# Patient Record
Sex: MALE | Race: WHITE | NOT HISPANIC OR LATINO | Employment: OTHER | URBAN - METROPOLITAN AREA
[De-identification: names, ages, dates, MRNs, and addresses within clinical notes are randomized per-mention and may not be internally consistent; named-entity substitution may affect disease eponyms.]

---

## 2018-05-22 ENCOUNTER — TELEPHONE (OUTPATIENT)
Dept: RHEUMATOLOGY | Facility: CLINIC | Age: 43
End: 2018-05-22

## 2018-05-22 NOTE — TELEPHONE ENCOUNTER
----- Message from Oseas Hart sent at 5/22/2018 10:27 AM CDT -----  Contact: self- [phone]  Patient called in regards to getting lab orders so that he can get his infusion on this Friday at 8am. Please contact patient at [phone].

## 2018-05-22 NOTE — TELEPHONE ENCOUNTER
Spoke with pt and he is seen over at the [facility]. Advised patient he will need to contact the [facility] clinic for lab orders. Pt verbalized understanding.

## 2019-02-11 ENCOUNTER — TELEPHONE (OUTPATIENT)
Dept: RHEUMATOLOGY | Facility: CLINIC | Age: 44
End: 2019-02-11

## 2019-02-11 NOTE — TELEPHONE ENCOUNTER
----- Message from Shana Luevano sent at 2/11/2019  3:18 PM CST -----  Contact: pt  Type:  RX Refill Request    Who Called: Rx  Refill or New Rx:Refill  RX Name and Strength: Steriod dofax for his goout flare-ups  How is the patient currently taking it? (ex. 1XDay): as needed  Is this a 30 day or 90 day RX:30  Preferred Pharmacy with phone number:[facility]  Local or Mail Order:local  Ordering Provider:Dr. Odom  Would the patient rather a call back or a response via [facility]? Call back  Best Call Back Number:[phone]  Additional Information: N/A

## 2019-02-11 NOTE — TELEPHONE ENCOUNTER
Called patient back he is calling for another medrol dose pack. Didn't see a order to do a refill.  Please Advise.

## 2019-02-12 NOTE — TELEPHONE ENCOUNTER
----- Message from Carlo Tunrer sent at 2/12/2019 12:35 PM CST -----  Contact: self [phone]  .Type:  Needs Medical Advice    Who Called: Jonh Najera  Symptoms (please be specific): n/a   How long has patient had these symptoms:  n/a  Pharmacy name and phone #:  n/a  Would the patient rather a call back or a response via [facility]? Call back  Best Call Back Number: [phone]  Additional Information: Would like to follow-up with nurse regarding status of refill request

## 2019-02-12 NOTE — TELEPHONE ENCOUNTER
Spoke with patient. He is in a gout flare and need a medrol dose pack called in. Patient advised that he will have to call the [facility]. Number given for [facility].

## 2019-03-22 ENCOUNTER — HOSPITAL ENCOUNTER (INPATIENT)
Facility: HOSPITAL | Age: 44
LOS: 5 days | Discharge: HOME | DRG: 478 | End: 2019-03-27
Attending: EMERGENCY MEDICINE | Admitting: INTERNAL MEDICINE
Payer: MEDICAID

## 2019-03-22 DIAGNOSIS — I33.0 INFECTIVE ENDOCARDITIS: ICD-10-CM

## 2019-03-22 DIAGNOSIS — M1A.00X0 CHRONIC GOUTY ARTHRITIS: ICD-10-CM

## 2019-03-22 DIAGNOSIS — L03.119 CELLULITIS AND ABSCESS OF FOOT: ICD-10-CM

## 2019-03-22 DIAGNOSIS — M86.9 OSTEOMYELITIS OF RIGHT FOOT, UNSPECIFIED TYPE: Primary | ICD-10-CM

## 2019-03-22 DIAGNOSIS — L02.619 CELLULITIS AND ABSCESS OF FOOT: ICD-10-CM

## 2019-03-22 PROBLEM — L03.031 CELLULITIS AND ABSCESS OF TOE, RIGHT: Status: ACTIVE | Noted: 2019-03-22

## 2019-03-22 PROBLEM — L02.611 CELLULITIS AND ABSCESS OF TOE, RIGHT: Status: ACTIVE | Noted: 2019-03-22

## 2019-03-22 LAB
ALBUMIN SERPL BCP-MCNC: 3.1 G/DL
ALP SERPL-CCNC: 110 U/L
ALT SERPL W/O P-5'-P-CCNC: 18 U/L
ANION GAP SERPL CALC-SCNC: 13 MMOL/L
AST SERPL-CCNC: 22 U/L
BASOPHILS # BLD AUTO: ABNORMAL K/UL
BASOPHILS NFR BLD: 0 %
BILIRUB SERPL-MCNC: 0.6 MG/DL
BUN SERPL-MCNC: 20 MG/DL
CALCIUM SERPL-MCNC: 10.1 MG/DL
CHLORIDE SERPL-SCNC: 97 MMOL/L
CO2 SERPL-SCNC: 25 MMOL/L
CREAT SERPL-MCNC: 1.1 MG/DL
CRP SERPL-MCNC: 154.8 MG/L
DIFFERENTIAL METHOD: ABNORMAL
EOSINOPHIL # BLD AUTO: ABNORMAL K/UL
EOSINOPHIL NFR BLD: 4 %
ERYTHROCYTE [DISTWIDTH] IN BLOOD BY AUTOMATED COUNT: 13.8 %
EST. GFR  (AFRICAN AMERICAN): >60 ML/MIN/1.73 M^2
EST. GFR  (NON AFRICAN AMERICAN): >60 ML/MIN/1.73 M^2
GLUCOSE SERPL-MCNC: 120 MG/DL
HCT VFR BLD AUTO: 44.6 %
HGB BLD-MCNC: 15.4 G/DL
LYMPHOCYTES # BLD AUTO: ABNORMAL K/UL
LYMPHOCYTES NFR BLD: 23 %
MCH RBC QN AUTO: 28.1 PG
MCHC RBC AUTO-ENTMCNC: 34.5 G/DL
MCV RBC AUTO: 81 FL
METAMYELOCYTES NFR BLD MANUAL: 2 %
MONOCYTES # BLD AUTO: ABNORMAL K/UL
MONOCYTES NFR BLD: 11 %
MYELOCYTES NFR BLD MANUAL: 3 %
NEUTROPHILS NFR BLD: 54 %
NEUTS BAND NFR BLD MANUAL: 3 %
PLATELET # BLD AUTO: 417 K/UL
PLATELET BLD QL SMEAR: ABNORMAL
PMV BLD AUTO: 9 FL
POTASSIUM SERPL-SCNC: 3.7 MMOL/L
PROT SERPL-MCNC: 8.5 G/DL
RBC # BLD AUTO: 5.48 M/UL
SODIUM SERPL-SCNC: 135 MMOL/L
WBC # BLD AUTO: 15.81 K/UL

## 2019-03-22 PROCEDURE — C1751 CATH, INF, PER/CENT/MIDLINE: HCPCS

## 2019-03-22 PROCEDURE — 86140 C-REACTIVE PROTEIN: CPT

## 2019-03-22 PROCEDURE — 87186 SC STD MICRODIL/AGAR DIL: CPT

## 2019-03-22 PROCEDURE — 96375 TX/PRO/DX INJ NEW DRUG ADDON: CPT

## 2019-03-22 PROCEDURE — 85007 BL SMEAR W/DIFF WBC COUNT: CPT

## 2019-03-22 PROCEDURE — 80053 COMPREHEN METABOLIC PANEL: CPT

## 2019-03-22 PROCEDURE — 99285 EMERGENCY DEPT VISIT HI MDM: CPT | Mod: 25

## 2019-03-22 PROCEDURE — 87077 CULTURE AEROBIC IDENTIFY: CPT

## 2019-03-22 PROCEDURE — 87040 BLOOD CULTURE FOR BACTERIA: CPT

## 2019-03-22 PROCEDURE — 86703 HIV-1/HIV-2 1 RESULT ANTBDY: CPT

## 2019-03-22 PROCEDURE — 85027 COMPLETE CBC AUTOMATED: CPT

## 2019-03-22 PROCEDURE — 36410 VNPNXR 3YR/> PHY/QHP DX/THER: CPT

## 2019-03-22 PROCEDURE — 96374 THER/PROPH/DIAG INJ IV PUSH: CPT

## 2019-03-22 PROCEDURE — 25000003 PHARM REV CODE 250: Performed by: EMERGENCY MEDICINE

## 2019-03-22 PROCEDURE — 63600175 PHARM REV CODE 636 W HCPCS: Performed by: EMERGENCY MEDICINE

## 2019-03-22 PROCEDURE — 11000001 HC ACUTE MED/SURG PRIVATE ROOM

## 2019-03-22 RX ORDER — MAG HYDROX/ALUMINUM HYD/SIMETH 200-200-20
30 SUSPENSION, ORAL (FINAL DOSE FORM) ORAL EVERY 6 HOURS PRN
Status: DISCONTINUED | OUTPATIENT
Start: 2019-03-23 | End: 2019-03-27 | Stop reason: HOSPADM

## 2019-03-22 RX ORDER — IPRATROPIUM BROMIDE AND ALBUTEROL SULFATE 2.5; .5 MG/3ML; MG/3ML
3 SOLUTION RESPIRATORY (INHALATION) EVERY 4 HOURS PRN
Status: DISCONTINUED | OUTPATIENT
Start: 2019-03-23 | End: 2019-03-27 | Stop reason: HOSPADM

## 2019-03-22 RX ORDER — SODIUM CHLORIDE 0.9 % (FLUSH) 0.9 %
5 SYRINGE (ML) INJECTION
Status: DISCONTINUED | OUTPATIENT
Start: 2019-03-23 | End: 2019-03-27 | Stop reason: HOSPADM

## 2019-03-22 RX ORDER — GUAIFENESIN 100 MG/5ML
200 SOLUTION ORAL EVERY 4 HOURS PRN
Status: DISCONTINUED | OUTPATIENT
Start: 2019-03-23 | End: 2019-03-27 | Stop reason: HOSPADM

## 2019-03-22 RX ORDER — OXYCODONE AND ACETAMINOPHEN 5; 325 MG/1; MG/1
1 TABLET ORAL EVERY 4 HOURS PRN
Status: DISCONTINUED | OUTPATIENT
Start: 2019-03-23 | End: 2019-03-27 | Stop reason: HOSPADM

## 2019-03-22 RX ORDER — LISINOPRIL 10 MG/1
10 TABLET ORAL DAILY
Status: DISCONTINUED | OUTPATIENT
Start: 2019-03-23 | End: 2019-03-27 | Stop reason: HOSPADM

## 2019-03-22 RX ORDER — VANCOMYCIN HCL IN 5 % DEXTROSE 1G/250ML
1000 PLASTIC BAG, INJECTION (ML) INTRAVENOUS
Status: DISCONTINUED | OUTPATIENT
Start: 2019-03-22 | End: 2019-03-22

## 2019-03-22 RX ORDER — METOPROLOL TARTRATE 50 MG/1
50 TABLET ORAL 2 TIMES DAILY
Status: DISCONTINUED | OUTPATIENT
Start: 2019-03-23 | End: 2019-03-27 | Stop reason: HOSPADM

## 2019-03-22 RX ORDER — ACETAMINOPHEN 325 MG/1
650 TABLET ORAL EVERY 8 HOURS PRN
Status: DISCONTINUED | OUTPATIENT
Start: 2019-03-23 | End: 2019-03-22

## 2019-03-22 RX ORDER — OXYCODONE AND ACETAMINOPHEN 10; 325 MG/1; MG/1
1 TABLET ORAL EVERY 4 HOURS PRN
Status: DISCONTINUED | OUTPATIENT
Start: 2019-03-23 | End: 2019-03-27 | Stop reason: HOSPADM

## 2019-03-22 RX ORDER — DIPHENHYDRAMINE HCL 25 MG
25 CAPSULE ORAL EVERY 6 HOURS PRN
Status: DISCONTINUED | OUTPATIENT
Start: 2019-03-23 | End: 2019-03-27 | Stop reason: HOSPADM

## 2019-03-22 RX ORDER — IBUPROFEN 200 MG
1 TABLET ORAL DAILY
Status: DISCONTINUED | OUTPATIENT
Start: 2019-03-23 | End: 2019-03-27 | Stop reason: HOSPADM

## 2019-03-22 RX ORDER — ACETAMINOPHEN 325 MG/1
650 TABLET ORAL EVERY 6 HOURS PRN
Status: DISCONTINUED | OUTPATIENT
Start: 2019-03-23 | End: 2019-03-27 | Stop reason: HOSPADM

## 2019-03-22 RX ORDER — AMLODIPINE BESYLATE 10 MG/1
10 TABLET ORAL DAILY
Status: DISCONTINUED | OUTPATIENT
Start: 2019-03-23 | End: 2019-03-27 | Stop reason: HOSPADM

## 2019-03-22 RX ORDER — ONDANSETRON 2 MG/ML
4 INJECTION INTRAMUSCULAR; INTRAVENOUS EVERY 6 HOURS PRN
Status: DISCONTINUED | OUTPATIENT
Start: 2019-03-23 | End: 2019-03-27 | Stop reason: HOSPADM

## 2019-03-22 RX ORDER — SODIUM CHLORIDE 9 MG/ML
INJECTION, SOLUTION INTRAVENOUS CONTINUOUS
Status: ACTIVE | OUTPATIENT
Start: 2019-03-23 | End: 2019-03-24

## 2019-03-22 RX ORDER — HYDRALAZINE HYDROCHLORIDE 20 MG/ML
10 INJECTION INTRAMUSCULAR; INTRAVENOUS EVERY 6 HOURS PRN
Status: DISCONTINUED | OUTPATIENT
Start: 2019-03-23 | End: 2019-03-27 | Stop reason: HOSPADM

## 2019-03-22 RX ORDER — ONDANSETRON 2 MG/ML
4 INJECTION INTRAMUSCULAR; INTRAVENOUS EVERY 12 HOURS PRN
Status: DISCONTINUED | OUTPATIENT
Start: 2019-03-23 | End: 2019-03-22

## 2019-03-22 RX ORDER — FAMOTIDINE 20 MG/1
20 TABLET, FILM COATED ORAL 2 TIMES DAILY
Status: DISCONTINUED | OUTPATIENT
Start: 2019-03-22 | End: 2019-03-27 | Stop reason: HOSPADM

## 2019-03-22 RX ORDER — VANCOMYCIN HCL IN 5 % DEXTROSE 1G/250ML
1000 PLASTIC BAG, INJECTION (ML) INTRAVENOUS
Status: COMPLETED | OUTPATIENT
Start: 2019-03-22 | End: 2019-03-22

## 2019-03-22 RX ADMIN — SODIUM CHLORIDE 500 ML: 0.9 INJECTION, SOLUTION INTRAVENOUS at 08:03

## 2019-03-22 RX ADMIN — PIPERACILLIN SODIUM AND TAZOBACTAM SODIUM 4.5 G: 4; .5 INJECTION, POWDER, LYOPHILIZED, FOR SOLUTION INTRAVENOUS at 08:03

## 2019-03-22 RX ADMIN — VANCOMYCIN HYDROCHLORIDE 1000 MG: 1 INJECTION, POWDER, FOR SOLUTION INTRAVENOUS at 08:03

## 2019-03-23 LAB
ALBUMIN SERPL BCP-MCNC: 2.6 G/DL
ALP SERPL-CCNC: 83 U/L
ALT SERPL W/O P-5'-P-CCNC: 14 U/L
ANION GAP SERPL CALC-SCNC: 10 MMOL/L
ANISOCYTOSIS BLD QL SMEAR: SLIGHT
AST SERPL-CCNC: 24 U/L
BASOPHILS NFR BLD: 0 %
BILIRUB SERPL-MCNC: 0.4 MG/DL
BUN SERPL-MCNC: 20 MG/DL
CALCIUM SERPL-MCNC: 9.2 MG/DL
CHLORIDE SERPL-SCNC: 98 MMOL/L
CO2 SERPL-SCNC: 28 MMOL/L
CREAT SERPL-MCNC: 1.3 MG/DL
DACRYOCYTES BLD QL SMEAR: ABNORMAL
DIFFERENTIAL METHOD: ABNORMAL
EOSINOPHIL NFR BLD: 8 %
ERYTHROCYTE [DISTWIDTH] IN BLOOD BY AUTOMATED COUNT: 13.9 %
EST. GFR  (AFRICAN AMERICAN): >60 ML/MIN/1.73 M^2
EST. GFR  (NON AFRICAN AMERICAN): >60 ML/MIN/1.73 M^2
GLUCOSE SERPL-MCNC: 105 MG/DL
HCT VFR BLD AUTO: 42.9 %
HGB BLD-MCNC: 14.5 G/DL
LACTATE SERPL-SCNC: 1.3 MMOL/L
LACTATE SERPL-SCNC: 1.4 MMOL/L
LYMPHOCYTES NFR BLD: 19 %
MCH RBC QN AUTO: 28 PG
MCHC RBC AUTO-ENTMCNC: 33.8 G/DL
MCV RBC AUTO: 83 FL
MONOCYTES NFR BLD: 21 %
MYELOCYTES NFR BLD MANUAL: 1 %
NEUTROPHILS NFR BLD: 51 %
PLATELET # BLD AUTO: 352 K/UL
PLATELET BLD QL SMEAR: ABNORMAL
PMV BLD AUTO: 8.9 FL
POIKILOCYTOSIS BLD QL SMEAR: SLIGHT
POTASSIUM SERPL-SCNC: 3.7 MMOL/L
PROT SERPL-MCNC: 7.2 G/DL
RBC # BLD AUTO: 5.18 M/UL
SODIUM SERPL-SCNC: 136 MMOL/L
SPHEROCYTES BLD QL SMEAR: ABNORMAL
VANCOMYCIN TROUGH SERPL-MCNC: 13.8 UG/ML
WBC # BLD AUTO: 12.37 K/UL

## 2019-03-23 PROCEDURE — 11000001 HC ACUTE MED/SURG PRIVATE ROOM

## 2019-03-23 PROCEDURE — S4991 NICOTINE PATCH NONLEGEND: HCPCS | Performed by: INTERNAL MEDICINE

## 2019-03-23 PROCEDURE — A9585 GADOBUTROL INJECTION: HCPCS | Performed by: EMERGENCY MEDICINE

## 2019-03-23 PROCEDURE — 25000003 PHARM REV CODE 250: Performed by: INTERNAL MEDICINE

## 2019-03-23 PROCEDURE — 85007 BL SMEAR W/DIFF WBC COUNT: CPT

## 2019-03-23 PROCEDURE — 80202 ASSAY OF VANCOMYCIN: CPT

## 2019-03-23 PROCEDURE — 63600175 PHARM REV CODE 636 W HCPCS: Performed by: EMERGENCY MEDICINE

## 2019-03-23 PROCEDURE — 63600175 PHARM REV CODE 636 W HCPCS: Performed by: INTERNAL MEDICINE

## 2019-03-23 PROCEDURE — 25000003 PHARM REV CODE 250: Performed by: EMERGENCY MEDICINE

## 2019-03-23 PROCEDURE — 36415 COLL VENOUS BLD VENIPUNCTURE: CPT

## 2019-03-23 PROCEDURE — 83605 ASSAY OF LACTIC ACID: CPT

## 2019-03-23 PROCEDURE — 80053 COMPREHEN METABOLIC PANEL: CPT

## 2019-03-23 PROCEDURE — 25500020 PHARM REV CODE 255: Performed by: EMERGENCY MEDICINE

## 2019-03-23 PROCEDURE — 85027 COMPLETE CBC AUTOMATED: CPT

## 2019-03-23 RX ORDER — VANCOMYCIN HCL IN 5 % DEXTROSE 1.5G/250ML
20 PLASTIC BAG, INJECTION (ML) INTRAVENOUS
Status: DISCONTINUED | OUTPATIENT
Start: 2019-03-23 | End: 2019-03-23

## 2019-03-23 RX ORDER — GADOBUTROL 604.72 MG/ML
10 INJECTION INTRAVENOUS
Status: COMPLETED | OUTPATIENT
Start: 2019-03-23 | End: 2019-03-23

## 2019-03-23 RX ORDER — KETOROLAC TROMETHAMINE 30 MG/ML
15 INJECTION, SOLUTION INTRAMUSCULAR; INTRAVENOUS 2 TIMES DAILY PRN
Status: DISCONTINUED | OUTPATIENT
Start: 2019-03-23 | End: 2019-03-25

## 2019-03-23 RX ORDER — VANCOMYCIN HCL IN 5 % DEXTROSE 1.5G/250ML
20 PLASTIC BAG, INJECTION (ML) INTRAVENOUS
Status: DISCONTINUED | OUTPATIENT
Start: 2019-03-23 | End: 2019-03-26

## 2019-03-23 RX ADMIN — PIPERACILLIN SODIUM AND TAZOBACTAM SODIUM 4.5 G: 4; .5 INJECTION, POWDER, LYOPHILIZED, FOR SOLUTION INTRAVENOUS at 01:03

## 2019-03-23 RX ADMIN — OXYCODONE AND ACETAMINOPHEN 1 TABLET: 10; 325 TABLET ORAL at 07:03

## 2019-03-23 RX ADMIN — PIPERACILLIN SODIUM AND TAZOBACTAM SODIUM 4.5 G: 4; .5 INJECTION, POWDER, LYOPHILIZED, FOR SOLUTION INTRAVENOUS at 08:03

## 2019-03-23 RX ADMIN — SODIUM CHLORIDE: 0.9 INJECTION, SOLUTION INTRAVENOUS at 01:03

## 2019-03-23 RX ADMIN — OXYCODONE AND ACETAMINOPHEN 1 TABLET: 10; 325 TABLET ORAL at 06:03

## 2019-03-23 RX ADMIN — KETOROLAC TROMETHAMINE 15 MG: 30 INJECTION, SOLUTION INTRAMUSCULAR; INTRAVENOUS at 03:03

## 2019-03-23 RX ADMIN — FAMOTIDINE 20 MG: 20 TABLET, FILM COATED ORAL at 08:03

## 2019-03-23 RX ADMIN — PIPERACILLIN SODIUM AND TAZOBACTAM SODIUM 4.5 G: 4; .5 INJECTION, POWDER, LYOPHILIZED, FOR SOLUTION INTRAVENOUS at 06:03

## 2019-03-23 RX ADMIN — LISINOPRIL 10 MG: 10 TABLET ORAL at 08:03

## 2019-03-23 RX ADMIN — VANCOMYCIN HYDROCHLORIDE 1500 MG: 100 INJECTION, POWDER, LYOPHILIZED, FOR SOLUTION INTRAVENOUS at 03:03

## 2019-03-23 RX ADMIN — OXYCODONE AND ACETAMINOPHEN 1 TABLET: 10; 325 TABLET ORAL at 01:03

## 2019-03-23 RX ADMIN — NICOTINE 1 PATCH: 21 PATCH, EXTENDED RELEASE TRANSDERMAL at 08:03

## 2019-03-23 RX ADMIN — GADOBUTROL 10 ML: 604.72 INJECTION INTRAVENOUS at 12:03

## 2019-03-23 RX ADMIN — METOPROLOL TARTRATE 50 MG: 50 TABLET ORAL at 08:03

## 2019-03-23 RX ADMIN — AMLODIPINE BESYLATE 10 MG: 10 TABLET ORAL at 08:03

## 2019-03-23 RX ADMIN — VANCOMYCIN HYDROCHLORIDE 1500 MG: 100 INJECTION, POWDER, LYOPHILIZED, FOR SOLUTION INTRAVENOUS at 09:03

## 2019-03-23 RX ADMIN — SODIUM CHLORIDE: 0.9 INJECTION, SOLUTION INTRAVENOUS at 08:03

## 2019-03-23 NOTE — ED PROVIDER NOTES
SCRIBE #1 NOTE: I, Krupa Paredes, am scribing for, and in the presence of, Sekou Gallagher Jr., MD. I have scribed the entire note.      History      Chief Complaint   Patient presents with    Abscess     abscess/sore to R great toe from gout for about 4 days       Review of patient's allergies indicates:   Allergen Reactions    Compazine [prochlorperazine edisylate] Other (See Comments)     Muscle spasms    Inapsine [droperidol] Other (See Comments)     Muscle spasms        HPI   HPI    3/22/2019, 8:37 PM   History obtained from the patient      History of Present Illness: Jonh Najera is a 43 y.o. male patient with a PMHx of gout, IV drug abuse, alcohol abuse, Hep C, HTN, who presents to the Emergency Department for a recurrent abscess to R foot which onset gradually 4 days ago. Symptoms are constant and moderate in severity.  No mitigating or exacerbating factors reported. Associated sxs include drainage. Patient denies any fever, chills, extremity weakness/numbness, abd pain, n/v, HA, dizziness, and all other sxs at this time. No further complaints or concerns at this time.         Arrival mode: Personal vehicle    PCP: [facility]       Past Medical History:  Past Medical History:   Diagnosis Date    Amphetamine addiction     Arthritis     Cellulitis     Chronic joint pain     Chronic pancreatitis     Gout     Hepatitis C     Hypertension     IV drug abuse     amphetamines    Narcotic abuse        Past Surgical History:  Past Surgical History:   Procedure Laterality Date    arm surgery      CHOLECYSTECTOMY      FACIAL RECONSTRUCTION SURGERY      FOOT SURGERY           Family History:  Family History   Problem Relation Age of Onset    Diabetes Mother     Hypertension Mother     Heart disease Mother     Diabetes Father     Hypertension Father     Heart disease Father     Diabetes Paternal Grandmother     Hypertension Paternal Grandmother        Social History:  Social  History     Tobacco Use    Smoking status: Current Every Day Smoker     Packs/day: 0.50     Types: Cigarettes    Smokeless tobacco: Never Used   Substance and Sexual Activity    Alcohol use: No    Drug use: Yes     Types: Amphetamines, Methamphetamines, IV     Comment: Pt is using IV heroine, methamphetamines, cocaine, and opana    Sexual activity: Yes     Partners: Female       ROS   Review of Systems   Constitutional: Negative for chills, diaphoresis and fever.   HENT: Negative for sore throat.    Respiratory: Negative for cough and shortness of breath.    Cardiovascular: Negative for chest pain.   Gastrointestinal: Negative for abdominal pain, nausea and vomiting.   Genitourinary: Negative for dysuria.   Musculoskeletal: Negative for back pain.   Skin: Positive for wound (abscess to R foot). Negative for rash.   Neurological: Negative for dizziness, weakness, numbness and headaches.   Hematological: Does not bruise/bleed easily.   All other systems reviewed and are negative.      Physical Exam      Initial Vitals [03/22/19 2009]   BP Pulse Resp Temp SpO2   (!) 143/94 91 20 97.8 °F (36.6 °C) 97 %      MAP       --          Physical Exam  Nursing Notes and Vital Signs Reviewed.  Constitutional: Patient is in no acute distress. Well-developed and well-nourished.  Head: Atraumatic. Normocephalic.  Eyes: PERRL. EOM intact. Conjunctivae are not pale. No scleral icterus.  ENT: Mucous membranes are moist. Oropharynx is clear and symmetric.    Neck: Supple. Full ROM. No lymphadenopathy.  Cardiovascular: Regular rate. Regular rhythm. No murmurs, rubs, or gallops. Distal pulses are 2+ and symmetric.  Pulmonary/Chest: No respiratory distress. Clear to auscultation bilaterally. No wheezing or rales.  Abdominal: Soft and non-distended.  There is no tenderness.  No rebound, guarding, or rigidity. Good bowel sounds.  Genitourinary: No CVA tenderness  Musculoskeletal: Moves all extremities. No obvious deformities. No edema.  "No calf tenderness.  RLE:  There is a bandage over the right MTP of the big toe.  There is drainage noted on this bandage.  Upon removal, there is moderate swelling with erythema over the MTP.  There is an open necrotic lesion about the size of a quarter with purulent and serous drainage. There is no fluctuance or abscess noted. There is erythema for the tip of the toe to the midfoot medially.  Pain with active ROM of the MTP. Cap refill distally is <2 seconds. DP and PT pulses are equal and 2+ bilaterally. No motor deficit. No distal sensory deficit  Skin: Warm and dry. Open lesion to the right forefoot as per the right lower extremity exam above.  Neurological:  Alert, awake, and appropriate.  Normal speech.  No acute focal neurological deficits are appreciated.  Psychiatric: Normal affect. Good eye contact. Appropriate in content.    ED Course    Procedures  ED Vital Signs:  Vitals:    03/22/19 2009 03/22/19 2225   BP: (!) 143/94    Pulse: 91 88   Resp: 20    Temp: 97.8 °F (36.6 °C)    SpO2: 97%    Height: 5' 7" (1.702 m)        Abnormal Lab Results:  Labs Reviewed   CBC W/ AUTO DIFFERENTIAL - Abnormal; Notable for the following components:       Result Value    WBC 15.81 (*)     MCV 81 (*)     Platelets 417 (*)     MPV 9.0 (*)     All other components within normal limits   COMPREHENSIVE METABOLIC PANEL - Abnormal; Notable for the following components:    Sodium 135 (*)     Glucose 120 (*)     Total Protein 8.5 (*)     Albumin 3.1 (*)     All other components within normal limits   C-REACTIVE PROTEIN - Abnormal; Notable for the following components:    .8 (*)     All other components within normal limits   CULTURE, BLOOD   CULTURE, BLOOD   HIV 1 / 2 ANTIBODY        All Lab Results:  Results for orders placed or performed during the hospital encounter of 03/22/19   CBC auto differential   Result Value Ref Range    WBC 15.81 (H) 3.90 - 12.70 K/uL    RBC 5.48 4.60 - 6.20 M/uL    Hemoglobin 15.4 14.0 - 18.0 " g/dL    Hematocrit 44.6 40.0 - 54.0 %    MCV 81 (L) 82 - 98 fL    MCH 28.1 27.0 - 31.0 pg    MCHC 34.5 32.0 - 36.0 g/dL    RDW 13.8 11.5 - 14.5 %    Platelets 417 (H) 150 - 350 K/uL    MPV 9.0 (L) 9.2 - 12.9 fL    Lymph # CANCELED 1.0 - 4.8 K/uL    Mono # CANCELED 0.3 - 1.0 K/uL    Eos # CANCELED 0.0 - 0.5 K/uL    Baso # CANCELED 0.00 - 0.20 K/uL    Gran% 54.0 38.0 - 73.0 %    Lymph% 23.0 18.0 - 48.0 %    Mono% 11.0 4.0 - 15.0 %    Eosinophil% 4.0 0.0 - 8.0 %    Basophil% 0.0 0.0 - 1.9 %    Bands 3.0 %    Metamyelocytes 2.0 %    Myelocytes 3.0 %    Platelet Estimate Appears normal     Differential Method Manual    Comprehensive metabolic panel   Result Value Ref Range    Sodium 135 (L) 136 - 145 mmol/L    Potassium 3.7 3.5 - 5.1 mmol/L    Chloride 97 95 - 110 mmol/L    CO2 25 23 - 29 mmol/L    Glucose 120 (H) 70 - 110 mg/dL    BUN, Bld 20 6 - 20 mg/dL    Creatinine 1.1 0.5 - 1.4 mg/dL    Calcium 10.1 8.7 - 10.5 mg/dL    Total Protein 8.5 (H) 6.0 - 8.4 g/dL    Albumin 3.1 (L) 3.5 - 5.2 g/dL    Total Bilirubin 0.6 0.1 - 1.0 mg/dL    Alkaline Phosphatase 110 55 - 135 U/L    AST 22 10 - 40 U/L    ALT 18 10 - 44 U/L    Anion Gap 13 8 - 16 mmol/L    eGFR if African American >60 >60 mL/min/1.73 m^2    eGFR if non African American >60 >60 mL/min/1.73 m^2   C-reactive protein   Result Value Ref Range    .8 (H) 0.0 - 8.2 mg/L         Imaging Results:  Imaging Results          X-Ray Foot Complete Right (Final result)  Result time 03/22/19 21:21:45    Final result by Prosper Bradley Jr., MD (03/22/19 21:21:45)                 Impression:      Arthritic-type changes with possible areas of erosion involving the 1st MTP joint.  Associated large amount of soft tissue swelling/cellulitis.      Electronically signed by: Prosper Bradley MD  Date:    03/22/2019  Time:    21:21             Narrative:    EXAMINATION:  XR FOOT COMPLETE 3 VIEW RIGHT    CLINICAL HISTORY:  Cellulitis of unspecified part of limb    COMPARISON:  No  comparison studies are available.    FINDINGS:  Bone alignment is satisfactory.  No fractures.  Narrowing of the 1st MTP joint.  Subchondral areas of lucency along the 1st MTP joint, could correlate with subchondral cysts and/or erosions.  Infection cannot be excluded.  Large amount of soft tissue swelling in this region.  Findings are suspicious for soft tissue infection.  Small amount of air in the soft tissues in this region appear likely.    Chronic appearing arthritic changes in the midfoot.  Fusion changes involving the talonavicular joint.                                        The Emergency Provider reviewed the vital signs and test results, which are outlined above.    ED Discussion     10:42 PM: Discussed case with Dr. Palomo ([facility]) who recommends MRI to rule out osteoporosis, vancomycin, and zosyn. Dr. Palomo agrees with current care and management of pt and accepts admission.   Admitting Service: [facility]   Admitting Physician: Dr. Palomo  Admit to: In-pt Med-Surg    10:47 PM: Re-evaluated pt. I have discussed test results, shared treatment plan, and the need for admission with patient and family at bedside. Pt and family express understanding at this time and agree with all information. All questions answered. Pt and family have no further questions or concerns at this time. Pt is ready for admit.            ED Medication(s):  Medications   sodium chloride 0.9% bolus 500 mL (500 mLs Intravenous New Bag 3/22/19 2059)   vancomycin in dextrose 5 % 1 gram/250 mL IVPB 1,000 mg (0 mg Intravenous Not Given 3/22/19 2100)   piperacillin-tazobactam 4.5 g in dextrose 5 % 100 mL IVPB (ready to mix system) (4.5 g Intravenous New Bag 3/22/19 2059)             Medical Decision Making    Medical Decision Making:   Clinical Tests:   Lab Tests: Ordered and Reviewed  Radiological Study: Ordered and Reviewed           Scribe Attestation:   Scribe #1: I performed the above scribed service and the  documentation accurately describes the services I performed. I attest to the accuracy of the note.    Attending:   Physician Attestation Statement for Scribe #1: I, Sekou Gallagher Jr., MD, personally performed the services described in this documentation, as scribed by Krupa Paredes, in my presence, and it is both accurate and complete.          Clinical Impression       ICD-10-CM ICD-9-CM   1. Osteomyelitis of right foot, unspecified type M86.9 730.27   2. Cellulitis and abscess of foot L03.119 682.7    L02.619        Disposition:   Disposition: Admitted  Condition: Serious         Sekou Gallagher Jr., MD  03/22/19 1736

## 2019-03-23 NOTE — SUBJECTIVE & OBJECTIVE
Interval History:     Review of Systems   Constitutional: Negative.  Negative for appetite change, fatigue and fever.   HENT: Negative.  Negative for congestion, nosebleeds and sore throat.    Eyes: Negative.  Negative for visual disturbance.   Respiratory: Negative.  Negative for cough, shortness of breath and wheezing.    Cardiovascular: Negative.  Negative for chest pain, palpitations and leg swelling.   Gastrointestinal: Negative.  Negative for abdominal pain, constipation, diarrhea, nausea and vomiting.   Endocrine: Negative.  Negative for polyuria.   Genitourinary: Negative.  Negative for dysuria, flank pain, frequency and urgency.   Musculoskeletal: Negative.  Negative for arthralgias, back pain and joint swelling.   Skin: Positive for color change and wound (right first/great toe). Negative for pallor and rash.   Allergic/Immunologic: Negative.  Negative for immunocompromised state.   Neurological: Negative.  Negative for dizziness, syncope, weakness, light-headedness, numbness and headaches.   Hematological: Negative.    Psychiatric/Behavioral: Negative.  Negative for confusion and hallucinations. The patient is not nervous/anxious.    All other systems reviewed and are negative.    Objective:     Vital Signs (Most Recent):  Temp: 98.6 °F (37 °C) (03/23/19 1327)  Pulse: 77 (03/23/19 1327)  Resp: 18 (03/23/19 1327)  BP: 114/77 (03/23/19 1327)  SpO2: 96 % (03/23/19 1327) Vital Signs (24h Range):  Temp:  [97.3 °F (36.3 °C)-98.7 °F (37.1 °C)] 98.6 °F (37 °C)  Pulse:  [77-97] 77  Resp:  [14-20] 18  SpO2:  [95 %-100 %] 96 %  BP: (107-145)/(74-96) 114/77     Weight: 97.3 kg (214 lb 8.1 oz)  Body mass index is 33.6 kg/m².    Intake/Output Summary (Last 24 hours) at 3/23/2019 1351  Last data filed at 3/23/2019 0600  Gross per 24 hour   Intake 815 ml   Output 500 ml   Net 315 ml      Physical Exam   Constitutional: He is oriented to person, place, and time. He appears well-developed and well-nourished. No distress.    HENT:   Head: Normocephalic and atraumatic.   Eyes: Conjunctivae and EOM are normal. No scleral icterus.   Neck: Normal range of motion. Neck supple. No thyromegaly present.   Cardiovascular: Normal rate, regular rhythm and normal heart sounds.   No murmur heard.  Pulmonary/Chest: Effort normal and breath sounds normal. No respiratory distress. He has no wheezes. He exhibits no tenderness.   Abdominal: Soft. Bowel sounds are normal. There is no tenderness.   Musculoskeletal: Normal range of motion. He exhibits deformity. He exhibits no edema or tenderness.   Lymphadenopathy:     He has no cervical adenopathy.   Neurological: He is alert and oriented to person, place, and time. No cranial nerve deficit. He exhibits normal muscle tone. Coordination normal.   Skin: Skin is warm and dry. He is not diaphoretic. There is erythema (Large open purulent non-draining wound, right medial great toe, with significant surrounding erythema and local swelling).   Psychiatric: He has a normal mood and affect. His behavior is normal. Thought content normal.   Nursing note and vitals reviewed.      Significant Labs: All pertinent labs within the past 24 hours have been reviewed.    Significant Imaging: I have reviewed all pertinent imaging results/findings within the past 24 hours.

## 2019-03-23 NOTE — HOSPITAL COURSE
42 y/o wm admitted with a dx of right toe cellutis and  An abscess . He was started on IVAB . The MRI show  Cellulitis and  Small fluid collection . Podiatrist  was consulted and plan for I&D  Tomorrow .  3/24 Pt was seen and examined at bedside . There was no acute event overnight .  He is schedule for I&D today . The blood cx are positive for GPC   3/25 Pt is s/p I&D today . The blood cx is (+) for  Staph . There was no acute event overnight   3/26 Pt was seen and examined at bedside . The blood cx  Is Staph  MSSA . The IVAB was changed to cefazolin . There was no acute event overnight   3/27 Pt was schedule for Home   IVAB  Cefazolin  X 4 week for bacteremia /osteo , Patient will f/u PCP /Podiatrist / ID as outpt . The repeated blood cx is NGTD . Pt was seen and examined at bedside . He was determined to be suitable for d/c

## 2019-03-23 NOTE — ASSESSMENT & PLAN NOTE
Afebrile, HR 88-97, WBC 91494, 3% bands, lactic acid pending.  Source of infection right great toe cellulitis/gouty arthritis.  Follow up on blood and wound cultures.  Continue IV vancomycin, IV Zosyn empirically.

## 2019-03-23 NOTE — SUBJECTIVE & OBJECTIVE
Past Medical History:   Diagnosis Date    Amphetamine addiction     Arthritis     Cellulitis     Chronic joint pain     Chronic pancreatitis     Gout     Hepatitis C     Hypertension     IV drug abuse     amphetamines    Narcotic abuse        Past Surgical History:   Procedure Laterality Date    arm surgery      CHOLECYSTECTOMY      FACIAL RECONSTRUCTION SURGERY      FOOT SURGERY         Review of patient's allergies indicates:   Allergen Reactions    Compazine [prochlorperazine edisylate] Other (See Comments)     Muscle spasms    Inapsine [droperidol] Other (See Comments)     Muscle spasms       No current facility-administered medications on file prior to encounter.      Current Outpatient Medications on File Prior to Encounter   Medication Sig    colchicine 0.6 mg tablet 1 tablet PO at the first sign of a gout flare then 1 tablet PO 1 hour later.  Do not exceed 1.2 mg in a 24 hour period    indomethacin (INDOCIN) 25 MG capsule Take 1 capsule (25 mg total) by mouth 3 (three) times daily as needed (pain). Take with food    lisinopril 10 MG tablet Take 10 mg by mouth once daily.    metoprolol tartrate (LOPRESSOR) 50 MG tablet Take 50 mg by mouth 2 (two) times daily.     Family History     Problem Relation (Age of Onset)    Diabetes Mother, Father, Paternal Grandmother    Heart disease Mother, Father    Hypertension Mother, Father, Paternal Grandmother        Tobacco Use    Smoking status: Current Every Day Smoker     Packs/day: 0.50     Types: Cigarettes    Smokeless tobacco: Never Used   Substance and Sexual Activity    Alcohol use: No    Drug use: Yes     Types: Amphetamines, Methamphetamines, IV     Comment: Pt is using IV heroine, methamphetamines, cocaine, and opana    Sexual activity: Yes     Partners: Female     Review of Systems   Constitutional: Negative.  Negative for appetite change, fatigue and fever.   HENT: Negative.  Negative for congestion, nosebleeds and sore throat.     Eyes: Negative.  Negative for visual disturbance.   Respiratory: Negative.  Negative for cough, shortness of breath and wheezing.    Cardiovascular: Negative.  Negative for chest pain, palpitations and leg swelling.   Gastrointestinal: Negative.  Negative for abdominal pain, constipation, diarrhea, nausea and vomiting.   Endocrine: Negative.  Negative for polyuria.   Genitourinary: Negative.  Negative for dysuria, flank pain, frequency and urgency.   Musculoskeletal: Negative.  Negative for arthralgias, back pain and joint swelling.   Skin: Positive for color change and wound (right first/great toe). Negative for pallor and rash.   Allergic/Immunologic: Negative.  Negative for immunocompromised state.   Neurological: Negative.  Negative for dizziness, syncope, weakness, light-headedness, numbness and headaches.   Hematological: Negative.    Psychiatric/Behavioral: Negative.  Negative for confusion and hallucinations. The patient is not nervous/anxious.    All other systems reviewed and are negative.    Objective:     Vital Signs (Most Recent):  Temp: 97.8 °F (36.6 °C) (03/22/19 2009)  Pulse: 90 (03/22/19 2300)  Resp: 20 (03/22/19 2009)  BP: (!) 145/92 (03/22/19 2300)  SpO2: 95 % (03/22/19 2300) Vital Signs (24h Range):  Temp:  [97.8 °F (36.6 °C)] 97.8 °F (36.6 °C)  Pulse:  [88-97] 90  Resp:  [20] 20  SpO2:  [95 %-97 %] 95 %  BP: (143-145)/(92-96) 145/92     Weight: (S) (need bed weight)  Body mass index is 36.02 kg/m².    Physical Exam   Constitutional: He is oriented to person, place, and time. He appears well-developed and well-nourished. No distress.   HENT:   Head: Normocephalic and atraumatic.   Eyes: Conjunctivae and EOM are normal. No scleral icterus.   Neck: Normal range of motion. Neck supple. No thyromegaly present.   Cardiovascular: Normal rate, regular rhythm and normal heart sounds.   No murmur heard.  Pulmonary/Chest: Effort normal and breath sounds normal. No respiratory distress. He has no wheezes.  He exhibits no tenderness.   Abdominal: Soft. Bowel sounds are normal. There is no tenderness.   Musculoskeletal: Normal range of motion. He exhibits deformity. He exhibits no edema or tenderness.   Lymphadenopathy:     He has no cervical adenopathy.   Neurological: He is alert and oriented to person, place, and time. No cranial nerve deficit. He exhibits normal muscle tone. Coordination normal.   Skin: Skin is warm and dry. He is not diaphoretic. There is erythema (Large open purulent non-draining wound, right medial great toe, with significant surrounding erythema and local swelling).   Psychiatric: He has a normal mood and affect. His behavior is normal. Thought content normal.   Nursing note and vitals reviewed.        CRANIAL NERVES     CN III, IV, VI   Extraocular motions are normal.        Significant Labs:   BMP:   Recent Labs   Lab 03/22/19 2148   *   *   K 3.7   CL 97   CO2 25   BUN 20   CREATININE 1.1   CALCIUM 10.1     CBC:   Recent Labs   Lab 03/22/19 2148   WBC 15.81*   HGB 15.4   HCT 44.6   *     CMP:   Recent Labs   Lab 03/22/19 2148   *   K 3.7   CL 97   CO2 25   *   BUN 20   CREATININE 1.1   CALCIUM 10.1   PROT 8.5*   ALBUMIN 3.1*   BILITOT 0.6   ALKPHOS 110   AST 22   ALT 18   ANIONGAP 13   EGFRNONAA >60     All pertinent labs within the past 24 hours have been reviewed.    Significant Imaging: I have reviewed and interpreted all pertinent imaging results/findings within the past 24 hours.     Imaging Results          X-Ray Foot Complete Right (Final result)  Result time 03/22/19 21:21:45    Final result by Prosper Bradley Jr., MD (03/22/19 21:21:45)                 Impression:      Arthritic-type changes with possible areas of erosion involving the 1st MTP joint.  Associated large amount of soft tissue swelling/cellulitis.      Electronically signed by: Prosper Bradley MD  Date:    03/22/2019  Time:    21:21             Narrative:    EXAMINATION:  XR FOOT  COMPLETE 3 VIEW RIGHT    CLINICAL HISTORY:  Cellulitis of unspecified part of limb    COMPARISON:  No comparison studies are available.    FINDINGS:  Bone alignment is satisfactory.  No fractures.  Narrowing of the 1st MTP joint.  Subchondral areas of lucency along the 1st MTP joint, could correlate with subchondral cysts and/or erosions.  Infection cannot be excluded.  Large amount of soft tissue swelling in this region.  Findings are suspicious for soft tissue infection.  Small amount of air in the soft tissues in this region appear likely.    Chronic appearing arthritic changes in the midfoot.  Fusion changes involving the talonavicular joint.                                I have independently reviewed all pertinent labs within the past 24 hours.    I have independently reviewed, visualized and interpreted all pertinent imaging results within the past 24 hours and discussed the findings with the ED physician, Dr. Gallagher.

## 2019-03-23 NOTE — ASSESSMENT & PLAN NOTE
Acute gout flare up superimpose with an infection   Patient reports taking allopurinol on a daily basis.  Add HSAI PRN   Followed by rheumatologist, Dr. Odom in the [facility].

## 2019-03-23 NOTE — ASSESSMENT & PLAN NOTE
Patient reports taking allopurinol on a daily basis.  Not on steroids are indomethacin at home.  Followed by rheumatologist, Dr. Odom in the [facility].

## 2019-03-23 NOTE — PLAN OF CARE
"Pt states he lives with his "friend" and his "friend" will provide d/c transport.       03/23/19 1543   Discharge Assessment   Assessment Type Discharge Planning Assessment   Confirmed/corrected address and phone number on facesheet? Yes   Assessment information obtained from? Patient   Prior to hospitilization cognitive status: Alert/Oriented   Prior to hospitalization functional status: Needs Assistance   Current cognitive status: Alert/Oriented   Current Functional Status: Needs Assistance   Facility Arrived From: Home   Lives With ("friend")   Able to Return to Prior Arrangements yes   Is patient able to care for self after discharge? (States friend able to assist)   Who are your caregiver(s) and their phone number(s)? Jose Forrester [phone]   Patient's perception of discharge disposition admitted as an inpatient   Readmission Within the Last 30 Days no previous admission in last 30 days   Patient currently being followed by outpatient case management? No   Patient currently receives any other outside agency services? No   Equipment Currently Used at Home cane, straight   Do you have any problems affording any of your prescribed medications? No   Is the patient taking medications as prescribed? yes   Does the patient have transportation home? Yes   Transportation Anticipated family or friend will provide   Does the patient receive services at the [facility]? No   Discharge Plan A Home   DME Needed Upon Discharge  none   Patient/Family in Agreement with Plan yes     "

## 2019-03-23 NOTE — H&P
[facility] Hospital Medicine  History & Physical    Patient Name: Jonh Najera  MRN: [id number]  Admission Date: 3/22/2019  Attending Physician: Iron Palomo MD  Primary Care Provider: Provider [employer]         Patient information was obtained from patient, past medical records and ER records.     Subjective:     Principal Problem:Cellulitis and abscess of toe, right    Chief Complaint:   Chief Complaint   Patient presents with    Abscess     abscess/sore to R great toe from gout for about 4 days        HPI: Mr. Najera is a 43-year-old  male with PMH significant for gouty arthritis, followed by rheumatologist, Dr. Odom, HTN, presents to the ED complaining of right 1st metatarsal joint wound that started around 4-5 days ago.  Patient denies fever, chills.  In the ED he is found to have a purulent appearing large wound of the right medial 1st metatarsal joint.  WBC 45765.  3% bands.  Afebrile.  HR 88-97.  Lactic acid pending.  .  X-ray of the right foot reveals arthritic type changes with possibilities of erosion involving the 1st metatarsal joint, with associated large amount of soft tissue swelling/cellulitis.  Patient started on IV vancomycin, IV Zosyn.  MRI right foot pending to rule out osteomyelitis.    Past Medical History:   Diagnosis Date    Amphetamine addiction     Arthritis     Cellulitis     Chronic joint pain     Chronic pancreatitis     Gout     Hepatitis C     Hypertension     IV drug abuse     amphetamines    Narcotic abuse        Past Surgical History:   Procedure Laterality Date    arm surgery      CHOLECYSTECTOMY      FACIAL RECONSTRUCTION SURGERY      FOOT SURGERY         Review of patient's allergies indicates:   Allergen Reactions    Compazine [prochlorperazine edisylate] Other (See Comments)     Muscle spasms    Inapsine [droperidol] Other (See Comments)     Muscle spasms       No current facility-administered medications on file  prior to encounter.      Current Outpatient Medications on File Prior to Encounter   Medication Sig    colchicine 0.6 mg tablet 1 tablet PO at the first sign of a gout flare then 1 tablet PO 1 hour later.  Do not exceed 1.2 mg in a 24 hour period    indomethacin (INDOCIN) 25 MG capsule Take 1 capsule (25 mg total) by mouth 3 (three) times daily as needed (pain). Take with food    lisinopril 10 MG tablet Take 10 mg by mouth once daily.    metoprolol tartrate (LOPRESSOR) 50 MG tablet Take 50 mg by mouth 2 (two) times daily.     Family History     Problem Relation (Age of Onset)    Diabetes Mother, Father, Paternal Grandmother    Heart disease Mother, Father    Hypertension Mother, Father, Paternal Grandmother        Tobacco Use    Smoking status: Current Every Day Smoker     Packs/day: 0.50     Types: Cigarettes    Smokeless tobacco: Never Used   Substance and Sexual Activity    Alcohol use: No    Drug use: Yes     Types: Amphetamines, Methamphetamines, IV     Comment: Pt is using IV heroine, methamphetamines, cocaine, and opana    Sexual activity: Yes     Partners: Female     Review of Systems   Constitutional: Negative.  Negative for appetite change, fatigue and fever.   HENT: Negative.  Negative for congestion, nosebleeds and sore throat.    Eyes: Negative.  Negative for visual disturbance.   Respiratory: Negative.  Negative for cough, shortness of breath and wheezing.    Cardiovascular: Negative.  Negative for chest pain, palpitations and leg swelling.   Gastrointestinal: Negative.  Negative for abdominal pain, constipation, diarrhea, nausea and vomiting.   Endocrine: Negative.  Negative for polyuria.   Genitourinary: Negative.  Negative for dysuria, flank pain, frequency and urgency.   Musculoskeletal: Negative.  Negative for arthralgias, back pain and joint swelling.   Skin: Positive for color change and wound (right first/great toe). Negative for pallor and rash.   Allergic/Immunologic: Negative.   Negative for immunocompromised state.   Neurological: Negative.  Negative for dizziness, syncope, weakness, light-headedness, numbness and headaches.   Hematological: Negative.    Psychiatric/Behavioral: Negative.  Negative for confusion and hallucinations. The patient is not nervous/anxious.    All other systems reviewed and are negative.    Objective:     Vital Signs (Most Recent):  Temp: 97.8 °F (36.6 °C) (03/22/19 2009)  Pulse: 90 (03/22/19 2300)  Resp: 20 (03/22/19 2009)  BP: (!) 145/92 (03/22/19 2300)  SpO2: 95 % (03/22/19 2300) Vital Signs (24h Range):  Temp:  [97.8 °F (36.6 °C)] 97.8 °F (36.6 °C)  Pulse:  [88-97] 90  Resp:  [20] 20  SpO2:  [95 %-97 %] 95 %  BP: (143-145)/(92-96) 145/92     Weight: (S) (need bed weight)  Body mass index is 36.02 kg/m².    Physical Exam   Constitutional: He is oriented to person, place, and time. He appears well-developed and well-nourished. No distress.   HENT:   Head: Normocephalic and atraumatic.   Eyes: Conjunctivae and EOM are normal. No scleral icterus.   Neck: Normal range of motion. Neck supple. No thyromegaly present.   Cardiovascular: Normal rate, regular rhythm and normal heart sounds.   No murmur heard.  Pulmonary/Chest: Effort normal and breath sounds normal. No respiratory distress. He has no wheezes. He exhibits no tenderness.   Abdominal: Soft. Bowel sounds are normal. There is no tenderness.   Musculoskeletal: Normal range of motion. He exhibits deformity. He exhibits no edema or tenderness.   Lymphadenopathy:     He has no cervical adenopathy.   Neurological: He is alert and oriented to person, place, and time. No cranial nerve deficit. He exhibits normal muscle tone. Coordination normal.   Skin: Skin is warm and dry. He is not diaphoretic. There is erythema (Large open purulent non-draining wound, right medial great toe, with significant surrounding erythema and local swelling).   Psychiatric: He has a normal mood and affect. His behavior is normal. Thought  content normal.   Nursing note and vitals reviewed.        CRANIAL NERVES     CN III, IV, VI   Extraocular motions are normal.        Significant Labs:   BMP:   Recent Labs   Lab 03/22/19 2148   *   *   K 3.7   CL 97   CO2 25   BUN 20   CREATININE 1.1   CALCIUM 10.1     CBC:   Recent Labs   Lab 03/22/19 2148   WBC 15.81*   HGB 15.4   HCT 44.6   *     CMP:   Recent Labs   Lab 03/22/19 2148   *   K 3.7   CL 97   CO2 25   *   BUN 20   CREATININE 1.1   CALCIUM 10.1   PROT 8.5*   ALBUMIN 3.1*   BILITOT 0.6   ALKPHOS 110   AST 22   ALT 18   ANIONGAP 13   EGFRNONAA >60     All pertinent labs within the past 24 hours have been reviewed.    Significant Imaging: I have reviewed and interpreted all pertinent imaging results/findings within the past 24 hours.     Imaging Results          X-Ray Foot Complete Right (Final result)  Result time 03/22/19 21:21:45    Final result by Prosper Bradley Jr., MD (03/22/19 21:21:45)                 Impression:      Arthritic-type changes with possible areas of erosion involving the 1st MTP joint.  Associated large amount of soft tissue swelling/cellulitis.      Electronically signed by: Prosper Bradley MD  Date:    03/22/2019  Time:    21:21             Narrative:    EXAMINATION:  XR FOOT COMPLETE 3 VIEW RIGHT    CLINICAL HISTORY:  Cellulitis of unspecified part of limb    COMPARISON:  No comparison studies are available.    FINDINGS:  Bone alignment is satisfactory.  No fractures.  Narrowing of the 1st MTP joint.  Subchondral areas of lucency along the 1st MTP joint, could correlate with subchondral cysts and/or erosions.  Infection cannot be excluded.  Large amount of soft tissue swelling in this region.  Findings are suspicious for soft tissue infection.  Small amount of air in the soft tissues in this region appear likely.    Chronic appearing arthritic changes in the midfoot.  Fusion changes involving the talonavicular joint.                                 I have independently reviewed all pertinent labs within the past 24 hours.    I have independently reviewed, visualized and interpreted all pertinent imaging results within the past 24 hours and discussed the findings with the ED physician, Dr. Gallagher.            Assessment/Plan:     * Cellulitis and abscess of toe, right    Large open wound/ulcer right medial great toe with purulence noted.  Started on IV vancomycin, IV Zosyn empirically.  Follow up on blood and wound cultures.  Wound Care consulted.  MRI right foot pending to rule out osteomyelitis.       SIRS (systemic inflammatory response syndrome)    Afebrile, HR 88-97, WBC 20416, 3% bands, lactic acid pending.  Source of infection right great toe cellulitis/gouty arthritis.  Follow up on blood and wound cultures.  Continue IV vancomycin, IV Zosyn empirically.       Chronic gouty arthritis    Patient reports taking allopurinol on a daily basis.  Not on steroids are indomethacin at home.  Followed by rheumatologist, Dr. Odom in the [facility].       Hypertension    Continue home dose lisinopril and amlodipine.  Avoid HCTZ.  Monitor blood pressure closely and make adjustments as needed.         VTE Risk Mitigation (From admission, onward)        Ordered     Place MOLLY hose  Until discontinued      03/22/19 4748             Iron Palomo MD  Department of Hospital Medicine   [facility]

## 2019-03-23 NOTE — PROGRESS NOTES
[facility] Hospital Medicine  Progress Note    Patient Name: Jonh Najera  MRN: [id number]  Patient Class: IP- Inpatient   Admission Date: 3/22/2019  Length of Stay: 1 days  Attending Physician: Faustino Schaefer, *  Primary Care Provider: Provider [employer]        Subjective:     Principal Problem:Cellulitis and abscess of toe, right    HPI:  Mr. Najera is a 43-year-old  male with PMH significant for gouty arthritis, followed by rheumatologist, Dr. Odom, HTN, presents to the ED complaining of right 1st metatarsal joint wound that started around 4-5 days ago.  Patient denies fever, chills.  In the ED he is found to have a purulent appearing large wound of the right medial 1st metatarsal joint.  WBC 64039.  3% bands.  Afebrile.  HR 88-97.  Lactic acid pending.  .  X-ray of the right foot reveals arthritic type changes with possibilities of erosion involving the 1st metatarsal joint, with associated large amount of soft tissue swelling/cellulitis.  Patient started on IV vancomycin, IV Zosyn.  MRI right foot pending to rule out osteomyelitis.    Hospital Course:  42 y/o wm admitted with a dx of right toe cellutis and  An abscess . He was started on IVAB . The MRI show  Cellulitis and  Small fluid collection . Podiatrist  was consulted and plan for I&D  Tomorrow .    Interval History:     Review of Systems   Constitutional: Negative.  Negative for appetite change, fatigue and fever.   HENT: Negative.  Negative for congestion, nosebleeds and sore throat.    Eyes: Negative.  Negative for visual disturbance.   Respiratory: Negative.  Negative for cough, shortness of breath and wheezing.    Cardiovascular: Negative.  Negative for chest pain, palpitations and leg swelling.   Gastrointestinal: Negative.  Negative for abdominal pain, constipation, diarrhea, nausea and vomiting.   Endocrine: Negative.  Negative for polyuria.   Genitourinary: Negative.  Negative for dysuria,  flank pain, frequency and urgency.   Musculoskeletal: Negative.  Negative for arthralgias, back pain and joint swelling.   Skin: Positive for color change and wound (right first/great toe). Negative for pallor and rash.   Allergic/Immunologic: Negative.  Negative for immunocompromised state.   Neurological: Negative.  Negative for dizziness, syncope, weakness, light-headedness, numbness and headaches.   Hematological: Negative.    Psychiatric/Behavioral: Negative.  Negative for confusion and hallucinations. The patient is not nervous/anxious.    All other systems reviewed and are negative.    Objective:     Vital Signs (Most Recent):  Temp: 98.6 °F (37 °C) (03/23/19 1327)  Pulse: 77 (03/23/19 1327)  Resp: 18 (03/23/19 1327)  BP: 114/77 (03/23/19 1327)  SpO2: 96 % (03/23/19 1327) Vital Signs (24h Range):  Temp:  [97.3 °F (36.3 °C)-98.7 °F (37.1 °C)] 98.6 °F (37 °C)  Pulse:  [77-97] 77  Resp:  [14-20] 18  SpO2:  [95 %-100 %] 96 %  BP: (107-145)/(74-96) 114/77     Weight: 97.3 kg (214 lb 8.1 oz)  Body mass index is 33.6 kg/m².    Intake/Output Summary (Last 24 hours) at 3/23/2019 1351  Last data filed at 3/23/2019 0600  Gross per 24 hour   Intake 815 ml   Output 500 ml   Net 315 ml      Physical Exam   Constitutional: He is oriented to person, place, and time. He appears well-developed and well-nourished. No distress.   HENT:   Head: Normocephalic and atraumatic.   Eyes: Conjunctivae and EOM are normal. No scleral icterus.   Neck: Normal range of motion. Neck supple. No thyromegaly present.   Cardiovascular: Normal rate, regular rhythm and normal heart sounds.   No murmur heard.  Pulmonary/Chest: Effort normal and breath sounds normal. No respiratory distress. He has no wheezes. He exhibits no tenderness.   Abdominal: Soft. Bowel sounds are normal. There is no tenderness.   Musculoskeletal: Normal range of motion. He exhibits deformity. He exhibits no edema or tenderness.   Lymphadenopathy:     He has no cervical  adenopathy.   Neurological: He is alert and oriented to person, place, and time. No cranial nerve deficit. He exhibits normal muscle tone. Coordination normal.   Skin: Skin is warm and dry. He is not diaphoretic. There is erythema (Large open purulent non-draining wound, right medial great toe, with significant surrounding erythema and local swelling).   Psychiatric: He has a normal mood and affect. His behavior is normal. Thought content normal.   Nursing note and vitals reviewed.      Significant Labs: All pertinent labs within the past 24 hours have been reviewed.    Significant Imaging: I have reviewed all pertinent imaging results/findings within the past 24 hours.    Assessment/Plan:      * Cellulitis and abscess of toe, right    Large open wound/ulcer right medial great toe with purulence noted.  Cont  IV vancomycin and  IV Zosyn   Follow up on blood and wound cultures.  Wound Care consulted.  MRI right foot show  cellulitis , small fluid collection ( abscess ) and possible osteo   Podiatrist consulted . Plan for  I&D tomorrow  ID consulted        Chronic gouty arthritis    Acute gout flare up superimpose with an infection   Patient reports taking allopurinol on a daily basis.  Add HSAI PRN   Followed by rheumatologist, Dr. Odom in the [facility].       SIRS (systemic inflammatory response syndrome)    2/2 to above   Cont current  tx        Hypertension    Continue home dose lisinopril and amlodipine.  Avoid HCTZ.  Monitor blood pressure closely and make adjustments as needed.         VTE Risk Mitigation (From admission, onward)        Ordered     Place MOLLY hose  Until discontinued      03/22/19 8250              Faustino Schaefer MD  Department of Hospital Medicine   [facility]

## 2019-03-23 NOTE — ASSESSMENT & PLAN NOTE
Large open wound/ulcer right medial great toe with purulence noted.  Started on IV vancomycin, IV Zosyn empirically.  Follow up on blood and wound cultures.  Wound Care consulted.  MRI right foot pending to rule out osteomyelitis.

## 2019-03-23 NOTE — ASSESSMENT & PLAN NOTE
Continue home dose lisinopril and amlodipine.  Avoid HCTZ.  Monitor blood pressure closely and make adjustments as needed.

## 2019-03-23 NOTE — CONSULTS
Subjective:     Patient ID: Jonh Najera is a 43 y.o. male.    Chief Complaint: Abscess (abscess/sore to R great toe from gout for about 4 days)    Jonh is a 43 y.o. male who presents to the clinic for evaluation and treatment of high risk feet. Jonh has a past medical history of Amphetamine addiction, Arthritis, Cellulitis, Chronic joint pain, Chronic pancreatitis, Gout, Hepatitis C, Hypertension, IV drug abuse, and Narcotic abuse. The patient's chief complaint is  Open gout wound. 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 Patient states he does take his gout medications as prescribed. Patient states wound has been present for about 3 weeks but it usually flares up and goes back down but this time it got wose and continued to drain.      Patient Active Problem List   Diagnosis    Gout flare    Arthritis    Hypertension    Chronic pancreatitis    Cellulitis and abscess of elbow    Cellulitis of foot, right    Streptococcal bacteremia    Drug abuse    SIRS (systemic inflammatory response syndrome)    Cellulitis of elbow    Cellulitis and abscess of toe, right    Chronic gouty arthritis       Current Discharge Medication List      CONTINUE these medications which have NOT CHANGED    Details   colchicine 0.6 mg tablet 1 tablet PO at the first sign of a gout flare then 1 tablet PO 1 hour later.  Do not exceed 1.2 mg in a 24 hour period  Qty: 18 tablet, Refills: 0      indomethacin (INDOCIN) 25 MG capsule Take 1 capsule (25 mg total) by mouth 3 (three) times daily as needed (pain). Take with food  Qty: 12 capsule, Refills: 0      lisinopril 10 MG tablet Take 10 mg by mouth once daily.      metoprolol tartrate (LOPRESSOR) 50 MG tablet Take 50 mg by mouth 2 (two) times daily.             Review of patient's allergies indicates:   Allergen Reactions    Compazine [prochlorperazine edisylate] Other (See Comments)     Muscle spasms    Inapsine [droperidol] Other (See Comments)     " Muscle spasms       Past Surgical History:   Procedure Laterality Date    arm surgery      CHOLECYSTECTOMY      FACIAL RECONSTRUCTION SURGERY      FOOT SURGERY         Family History   Problem Relation Age of Onset    Diabetes Mother     Hypertension Mother     Heart disease Mother     Diabetes Father     Hypertension Father     Heart disease Father     Diabetes Paternal Grandmother     Hypertension Paternal Grandmother        Social History     Socioeconomic History    Marital status:      Spouse name: Not on file    Number of children: Not on file    Years of education: Not on file    Highest education level: Not on file   Social Needs    Financial resource strain: Not on file    Food insecurity - worry: Not on file    Food insecurity - inability: Not on file    Transportation needs - medical: Not on file    Transportation needs - non-medical: Not on file   Occupational History    Not on file   Tobacco Use    Smoking status: Current Every Day Smoker     Packs/day: 0.50     Types: Cigarettes    Smokeless tobacco: Never Used   Substance and Sexual Activity    Alcohol use: No    Drug use: Yes     Types: Amphetamines, Methamphetamines, IV     Comment: Pt is using IV heroine, methamphetamines, cocaine, and opana    Sexual activity: Yes     Partners: Female   Other Topics Concern    Not on file   Social History Narrative    Not on file       Vitals:    03/22/19 2330 03/23/19 0115 03/23/19 0434 03/23/19 0828   BP: (!) 143/88 129/87 134/82 107/74   Pulse: 86 86 77 81   Resp:  16 18 14   Temp:  98.7 °F (37.1 °C) 97.3 °F (36.3 °C) 98.4 °F (36.9 °C)   TempSrc:  Oral Oral Oral   SpO2: 99% 96% 95% 100%   Weight:  97.3 kg (214 lb 8.1 oz)     Height:  5' 7" (1.702 m)         Review of Systems   Constitutional: Negative for chills and fever.   Respiratory: Negative for shortness of breath.    Cardiovascular: Negative for chest pain, palpitations, orthopnea, claudication and leg swelling. "   Gastrointestinal: Negative for diarrhea, nausea and vomiting.   Musculoskeletal: Positive for joint pain (right 1st mpj).   Skin: Negative for rash.   Neurological: Negative for dizziness, tingling, sensory change, focal weakness and weakness.   Psychiatric/Behavioral: Negative.          Objective:      PHYSICAL EXAM: Apperance: Alert and orient in no distress,well developed, and with good attention to grooming and body habits  Lower Extremity Exam  VASCULAR: Dorsalis pedis pulses 2/4 right and Posterior Tibial pulses 2/4 right. Capillary fill time <4 seconds right. Severe edema observed right. Varicosities absent right. Skin temperature of the lower extremities is warm to warm, proximal to distal. Hair growth WNL right. (--) lymphangitis or (+) cellulitis noted right.  DERMATOLOGICAL: (+) edema, (+) erythema, (--) malodor, (+) serosanguinous drainage, (+) warmth to right foot.  Ulcer noted to right medial 1st MPJ with fibrotic base and with a 1 mm yellow/white border.  The dorsum surface of the feet are soft and supple.  The plantar aspects of feet are dry and scaly. Webspaces 1,2,3,4 clean, dry and without evidence of break in skin integrity right.   NEUROLOGICAL: Light touch, sharp-dull, proprioception all present and equal bilaterally.    MUSCULOSKELETAL: Muscle strength is 5/5 for foot inverters, everters, plantarflexors, and dorsiflexors. Muscle tone is normal. Moderate gout arthritic changes noted to right foot and toes.         Imaging Results          MRI Foot Toes W WO Contrast Right (Final result)  Result time 03/23/19 09:07:16   Procedure changed from MRI Foot Toes Without Contrast Right     Final result by Krunal Yoder MD (03/23/19 09:07:16)                 Impression:      Multifocal gouty arthropathy as described previously.  Largest gouty tophus is at the 1st MTP joint.  Significant enhancement postcontrast with multiple small loculated areas of fluid collection/abscess within this tophus  concerning for superimposed infection/abscess (as described above).  Largest pocket along the dorsum of this tophus just lateral to the distal 1st metatarsal head.  Underlying erosive change and edema in the bone surrounding the 1st MTP joint could be related to the arthritis though certainly osteomyelitis is also a consideration.    There is also a gouty tophus near the base of the 5th metatarsal along its plantar aspect with a small enhancing complex cystic area of fluid at this location.  Correlate for abscess/infection at this site.  No evidence of osteomyelitis at this site.      Electronically signed by: Krunal Yoder MD  Date:    03/23/2019  Time:    09:07             Narrative:    EXAMINATION:  MRI FOOT TOES W WO CONTRAST RIGHT    CLINICAL HISTORY:  Foot pain, chronic, xray neg, inflammatory arthropathy suspected;Osteomyelitis suspected, foot swelling, diabetic;    TECHNIQUE:  Multiplanar/multisequence MRI performed of the right foot before and following gadolinium.    COMPARISON:  Right foot radiographs 03/22/2019.  MRI right foot 01/12/2014    FINDINGS:  There is extensive soft tissue induration that appears nodular surrounding the 1st MTP joint.  Significant enhancement throughout this area soft tissue nodularity following gadolinium administration with multiple small enhancing pockets of fluid/abscess.  This area of nodularity surrounding the 1st MTP joint extends both dorsally and to the plantar aspect of the 1st MTP joint also extending medially and laterally.  There is a greater degree of soft tissue nodularity along the dorsal aspect of the 1st MTP joint.  Representative area of nodularity as measured on sagittal images is approximately 4.2 x 2.6 cm on the dorsum with degree of soft tissue nodularity on the plantar aspect approximately 1.6 x 1.2 cm.  Multiple small pockets of fluid enhancing within this region.  The largest pocket which is along the dorsum of the 1st MTP joint along its lateral aspect  has a maximum diameter of approximately 1.9 x 1.6 cm.  Multiple small surrounding smaller pockets of fluid extending towards an area of skin ulceration.  There is erosive change surrounding the 1st MTP joint with significant arthritic narrowing.  There is abnormal signal and enhancement with this erosive change at the distal 1st metatarsal head and involving the base of the proximal phalanx.    Additionally there is significant arthritic changes throughout the midfoot and Lisfranc joint with areas of erosive change and bone marrow edema.  There is a smaller multiloculated rim enhancing collection in the lateral aspect of the foot at the level of the proximal 5th metatarsal along its plantar aspect.  Largest component of this collection is approximately 1.4 x 1.0 cm.  Some underlying reactive marrow edema without evidence of osteomyelitis.                               X-Ray Foot Complete Right (Final result)  Result time 03/22/19 21:21:45    Final result by Prosper Bradley Jr., MD (03/22/19 21:21:45)                 Impression:      Arthritic-type changes with possible areas of erosion involving the 1st MTP joint.  Associated large amount of soft tissue swelling/cellulitis.      Electronically signed by: Prosper Bradley MD  Date:    03/22/2019  Time:    21:21             Narrative:    EXAMINATION:  XR FOOT COMPLETE 3 VIEW RIGHT    CLINICAL HISTORY:  Cellulitis of unspecified part of limb    COMPARISON:  No comparison studies are available.    FINDINGS:  Bone alignment is satisfactory.  No fractures.  Narrowing of the 1st MTP joint.  Subchondral areas of lucency along the 1st MTP joint, could correlate with subchondral cysts and/or erosions.  Infection cannot be excluded.  Large amount of soft tissue swelling in this region.  Findings are suspicious for soft tissue infection.  Small amount of air in the soft tissues in this region appear likely.    Chronic appearing arthritic changes in the midfoot.  Fusion changes  involving the talonavicular joint.                                        Assessment:       Osteomyelitis of right foot, unspecified type    Cellulitis and abscess of foot  -     X-Ray Foot Complete Right; Standing    Other orders  -     HIV 1/2 Ag/Ab (4th Gen); Standing  -     CBC auto differential; Standing  -     Comprehensive metabolic panel; Standing  -     Blood culture #1 **CANNOT BE ORDERED STAT**; Standing  -     Blood culture #2 **CANNOT BE ORDERED STAT**; Standing  -     Insert Saline lock IV; Standing  -     sodium chloride 0.9% bolus 500 mL  -     C-reactive protein; Standing  -     vancomycin in dextrose 5 % 1 gram/250 mL IVPB 1,000 mg  -     piperacillin-tazobactam 4.5 g in dextrose 5 % 100 mL IVPB (ready to mix system)  -     Pharmacy to dose Vancomycin consult; Standing  -     Discontinue: vancomycin in dextrose 5 % 1 gram/250 mL IVPB 1,000 mg  -     Cancel: MRI Foot Toes Without Contrast Right; Standing  -     Cancel: MRI Foot Toes Without Contrast Right; Standing  -     MRI Foot Toes W WO Contrast Right; Standing  -     Full code; Standing  -     Vital signs; Standing  -     Log roll; Standing  -     Intake and output; Standing  -     Notify physician ; Standing  -     sodium chloride 0.9% flush 5 mL  -     Discontinue: ondansetron injection 4 mg  -     promethazine (PHENERGAN) 6.25 mg in dextrose 5 % 50 mL IVPB  -     Discontinue: acetaminophen tablet 650 mg  -     Pulse Oximetry Q4H; Standing  -     Admit to Inpatient; Standing  -     Progressive Mobility Protocol (mobilize patient to their highest level of functioning at least twice daily); Standing  -     Diet Adult Regular (IDDSI Level 7); Standing  -     Place MOLLY hose; Standing  -     piperacillin-tazobactam 4.5 g in dextrose 5 % 100 mL IVPB (ready to mix system)  -     Discontinue: vancomycin (VANCOCIN) 15 mg/kg in dextrose 5 % 500 mL IVPB  -     Cancel: CBC auto differential; Standing  -     Cancel: Comprehensive metabolic panel;  Standing  -     CBC auto differential; Standing  -     Comprehensive metabolic panel; Standing  -     0.9%  NaCl infusion  -     ondansetron injection 4 mg  -     acetaminophen tablet 650 mg  -     oxyCODONE-acetaminophen 5-325 mg per tablet 1 tablet  -     oxyCODONE-acetaminophen  mg per tablet 1 tablet  -     famotidine tablet 20 mg  -     guaifenesin 100 mg/5 ml syrup 200 mg  -     aluminum-magnesium hydroxide-simethicone 200-200-20 mg/5 mL suspension 30 mL  -     albuterol-ipratropium 2.5 mg-0.5 mg/3 mL nebulizer solution 3 mL  -     Inhalation Treatment Q4H PRN; Standing  -     diphenhydrAMINE capsule 25 mg  -     hydrALAZINE injection 10 mg  -     Inpatient consult to Wound Care; Standing  -     nicotine 21 mg/24 hr 1 patch  -     lisinopril tablet 10 mg  -     metoprolol tartrate (LOPRESSOR) tablet 50 mg  -     amLODIPine tablet 10 mg  -     Cancel: Lactic acid, plasma; Standing  -     Culture, Anaerobe; Standing  -     Aerobic culture; Standing  -     gadobutrol 10 mL  -     Cancel: Lactic acid, plasma; Standing  -     Lactic acid, plasma; Standing  -     Lactic acid, plasma; Standing  -     Inpatient consult to Wound Care; Standing  -     Discontinue: vancomycin 1.5 g in 5 % dextrose 250 mL IVPB  -     Cancel: VANCOMYCIN, TROUGH before 4th dose; Standing  -     vancomycin 1.5 g in 5 % dextrose 250 mL IVPB  -     VANCOMYCIN, TROUGH before next dose; Standing  -     Inpatient consult to Podiatry; Standing  -     Inpatient consult to Infectious Diseases; Standing            Plan:   Osteomyelitis of right foot, unspecified type    Cellulitis and abscess of foot  -     X-Ray Foot Complete Right; Standing    Other orders  -     HIV 1/2 Ag/Ab (4th Gen); Standing  -     CBC auto differential; Standing  -     Comprehensive metabolic panel; Standing  -     Blood culture #1 **CANNOT BE ORDERED STAT**; Standing  -     Blood culture #2 **CANNOT BE ORDERED STAT**; Standing  -     Insert Saline lock IV; Standing  -      sodium chloride 0.9% bolus 500 mL  -     C-reactive protein; Standing  -     vancomycin in dextrose 5 % 1 gram/250 mL IVPB 1,000 mg  -     piperacillin-tazobactam 4.5 g in dextrose 5 % 100 mL IVPB (ready to mix system)  -     Pharmacy to dose Vancomycin consult; Standing  -     Discontinue: vancomycin in dextrose 5 % 1 gram/250 mL IVPB 1,000 mg  -     Cancel: MRI Foot Toes Without Contrast Right; Standing  -     Cancel: MRI Foot Toes Without Contrast Right; Standing  -     MRI Foot Toes W WO Contrast Right; Standing  -     Full code; Standing  -     Vital signs; Standing  -     Log roll; Standing  -     Intake and output; Standing  -     Notify physician ; Standing  -     sodium chloride 0.9% flush 5 mL  -     Discontinue: ondansetron injection 4 mg  -     promethazine (PHENERGAN) 6.25 mg in dextrose 5 % 50 mL IVPB  -     Discontinue: acetaminophen tablet 650 mg  -     Pulse Oximetry Q4H; Standing  -     Admit to Inpatient; Standing  -     Progressive Mobility Protocol (mobilize patient to their highest level of functioning at least twice daily); Standing  -     Diet Adult Regular (IDDSI Level 7); Standing  -     Place MOLLY hose; Standing  -     piperacillin-tazobactam 4.5 g in dextrose 5 % 100 mL IVPB (ready to mix system)  -     Discontinue: vancomycin (VANCOCIN) 15 mg/kg in dextrose 5 % 500 mL IVPB  -     Cancel: CBC auto differential; Standing  -     Cancel: Comprehensive metabolic panel; Standing  -     CBC auto differential; Standing  -     Comprehensive metabolic panel; Standing  -     0.9%  NaCl infusion  -     ondansetron injection 4 mg  -     acetaminophen tablet 650 mg  -     oxyCODONE-acetaminophen 5-325 mg per tablet 1 tablet  -     oxyCODONE-acetaminophen  mg per tablet 1 tablet  -     famotidine tablet 20 mg  -     guaifenesin 100 mg/5 ml syrup 200 mg  -     aluminum-magnesium hydroxide-simethicone 200-200-20 mg/5 mL suspension 30 mL  -     albuterol-ipratropium 2.5 mg-0.5 mg/3 mL nebulizer  solution 3 mL  -     Inhalation Treatment Q4H PRN; Standing  -     diphenhydrAMINE capsule 25 mg  -     hydrALAZINE injection 10 mg  -     Inpatient consult to Wound Care; Standing  -     nicotine 21 mg/24 hr 1 patch  -     lisinopril tablet 10 mg  -     metoprolol tartrate (LOPRESSOR) tablet 50 mg  -     amLODIPine tablet 10 mg  -     Cancel: Lactic acid, plasma; Standing  -     Culture, Anaerobe; Standing  -     Aerobic culture; Standing  -     gadobutrol 10 mL  -     Cancel: Lactic acid, plasma; Standing  -     Lactic acid, plasma; Standing  -     Lactic acid, plasma; Standing  -     Inpatient consult to Wound Care; Standing  -     Discontinue: vancomycin 1.5 g in 5 % dextrose 250 mL IVPB  -     Cancel: VANCOMYCIN, TROUGH before 4th dose; Standing  -     vancomycin 1.5 g in 5 % dextrose 250 mL IVPB  -     VANCOMYCIN, TROUGH before next dose; Standing  -     Inpatient consult to Podiatry; Standing  -     Inpatient consult to Infectious Diseases; Standing      I counseled the patient on his conditions, regarding findings of my examination, my impressions, and usual treatment plan.   Discussed with patient x-ray and MRI findings.   Patient to be scheduled for right foot incision and drainage with bone biopsy in the AM.   Continue IV antibiotics.   Podiatry to continue to follow.             Sharnette Miles, DPM [facility]

## 2019-03-23 NOTE — PLAN OF CARE
Problem: Adult Inpatient Plan of Care  Goal: Plan of Care Review  Outcome: Ongoing (interventions implemented as appropriate)  Plan of care reviewed and discussed with patient.  No acute distress noted.  Safety and fall precautions in place.  Patient free from injury.  Oral hydration and ambulation promoted.  IV hydration maintained.  Denies pain.  Podiatry and ID consult.  Will continue to monitor.     12 hour chart check complete.

## 2019-03-23 NOTE — CONSULTS
John Najera [id number] is a 43 y.o. male who has been consulted for vancomycin dosing.  Consult ordered by Sekou Gallagher Jr., MD    Dx: SSTI, possible osteomyelitis of great toe  Vancomycin goal trough = 15-20 mcg/mL until Osteo is ruled out.   Concomitant abx: Zosyn 4.5 g every 8 hours    Tmax: 98.7 F,  Blood cultures: NGTD  The patient has the following labs:     Date Creatinine (mg/dl)    BUN WBC Count   3/23/2019 Estimated Creatinine Clearance: 95.3 mL/min (based on SCr of 1.1 mg/dL). Lab Results   Component Value Date    BUN 20 03/22/2019     Lab Results   Component Value Date    WBC 15.81 (H) 03/22/2019      which calculates to an Estimated Creatinine Clearance: 95.3 mL/min (based on SCr of 1.1 mg/dL)..       Current weight is 97.3 kg (214 lb 8.1 oz)    IBW: 66.1 kg,  AdjBW: 78.6 kg  DW: 78.6 kg    The patient will be started on vancomycin at a dose of 1,500 mg every 12 hours. Patient will be followed by pharmacy for changes in renal function, toxicity, and efficacy.  The vancomycin trough has been ordered for 3/24 at 1400 prior to the 4th dose.      Thank you for allowing us to participate in this patient's care.     Horacio Oliveira

## 2019-03-23 NOTE — PLAN OF CARE
Problem: Adult Inpatient Plan of Care  Goal: Plan of Care Review  Outcome: Ongoing (interventions implemented as appropriate)  POC reviewed, including indications and possible side effects of administered medications. Patient verbalized understanding and teach back. No adverse reactions noted. Patient c/o bilateral foot pain. Administered medications per order. Applied wet to dry dressing to R foot. Wound consult ordered. No s/s of acute distress noted. VSS. Patient remains free of falls and injuries during shift. Will continue to monitor.    Chart check complete.

## 2019-03-23 NOTE — HPI
Mr. Najera is a 43-year-old  male with PMH significant for gouty arthritis, followed by rheumatologist, Dr. Odom, HTN, presents to the ED complaining of right 1st metatarsal joint wound that started around 4-5 days ago.  Patient denies fever, chills.  In the ED he is found to have a purulent appearing large wound of the right medial 1st metatarsal joint.  WBC 34588.  3% bands.  Afebrile.  HR 88-97.  Lactic acid pending.  .  X-ray of the right foot reveals arthritic type changes with possibilities of erosion involving the 1st metatarsal joint, with associated large amount of soft tissue swelling/cellulitis.  Patient started on IV vancomycin, IV Zosyn.  MRI right foot pending to rule out osteomyelitis.

## 2019-03-23 NOTE — ASSESSMENT & PLAN NOTE
Large open wound/ulcer right medial great toe with purulence noted.  Cont  IV vancomycin and  IV Zosyn   Follow up on blood and wound cultures.  Wound Care consulted.  MRI right foot show  cellulitis , small fluid collection ( abscess ) and possible osteo   Podiatrist consulted . Plan for  I&D tomorrow  ID consulted

## 2019-03-24 ENCOUNTER — ANESTHESIA EVENT (OUTPATIENT)
Dept: SURGERY | Facility: HOSPITAL | Age: 44
DRG: 478 | End: 2019-03-24
Payer: MEDICAID

## 2019-03-24 ENCOUNTER — ANESTHESIA (OUTPATIENT)
Dept: SURGERY | Facility: HOSPITAL | Age: 44
DRG: 478 | End: 2019-03-24
Payer: MEDICAID

## 2019-03-24 LAB
ALBUMIN SERPL BCP-MCNC: 2.3 G/DL (ref 3.5–5.2)
ALP SERPL-CCNC: 73 U/L (ref 55–135)
ALT SERPL W/O P-5'-P-CCNC: 15 U/L (ref 10–44)
ANION GAP SERPL CALC-SCNC: 8 MMOL/L (ref 8–16)
AST SERPL-CCNC: 17 U/L (ref 10–40)
BASOPHILS # BLD AUTO: 0.06 K/UL (ref 0–0.2)
BASOPHILS NFR BLD: 0.6 % (ref 0–1.9)
BILIRUB SERPL-MCNC: 0.3 MG/DL (ref 0.1–1)
BUN SERPL-MCNC: 19 MG/DL (ref 6–20)
CALCIUM SERPL-MCNC: 8.7 MG/DL (ref 8.7–10.5)
CHLORIDE SERPL-SCNC: 105 MMOL/L (ref 95–110)
CO2 SERPL-SCNC: 26 MMOL/L (ref 23–29)
CREAT SERPL-MCNC: 1.4 MG/DL (ref 0.5–1.4)
DIFFERENTIAL METHOD: ABNORMAL
EOSINOPHIL # BLD AUTO: 1 K/UL (ref 0–0.5)
EOSINOPHIL NFR BLD: 10.1 % (ref 0–8)
ERYTHROCYTE [DISTWIDTH] IN BLOOD BY AUTOMATED COUNT: 14.1 % (ref 11.5–14.5)
EST. GFR  (AFRICAN AMERICAN): >60 ML/MIN/1.73 M^2
EST. GFR  (NON AFRICAN AMERICAN): >60 ML/MIN/1.73 M^2
GLUCOSE SERPL-MCNC: 110 MG/DL (ref 70–110)
HCT VFR BLD AUTO: 40.2 % (ref 40–54)
HGB BLD-MCNC: 13 G/DL (ref 14–18)
LYMPHOCYTES # BLD AUTO: 2.9 K/UL (ref 1–4.8)
LYMPHOCYTES NFR BLD: 28.9 % (ref 18–48)
MCH RBC QN AUTO: 27 PG (ref 27–31)
MCHC RBC AUTO-ENTMCNC: 32.3 G/DL (ref 32–36)
MCV RBC AUTO: 84 FL (ref 82–98)
MONOCYTES # BLD AUTO: 1.7 K/UL (ref 0.3–1)
MONOCYTES NFR BLD: 17.1 % (ref 4–15)
NEUTROPHILS # BLD AUTO: 4.3 K/UL (ref 1.8–7.7)
NEUTROPHILS NFR BLD: 43.3 % (ref 38–73)
PLATELET # BLD AUTO: 423 K/UL (ref 150–350)
PMV BLD AUTO: 9.1 FL (ref 9.2–12.9)
POTASSIUM SERPL-SCNC: 4.3 MMOL/L (ref 3.5–5.1)
PROT SERPL-MCNC: 6.6 G/DL (ref 6–8.4)
RBC # BLD AUTO: 4.81 M/UL (ref 4.6–6.2)
SODIUM SERPL-SCNC: 139 MMOL/L (ref 136–145)
VANCOMYCIN TROUGH SERPL-MCNC: 14.4 UG/ML (ref 10–22)
WBC # BLD AUTO: 9.88 K/UL (ref 3.9–12.7)

## 2019-03-24 PROCEDURE — 63600175 PHARM REV CODE 636 W HCPCS: Performed by: EMERGENCY MEDICINE

## 2019-03-24 PROCEDURE — 63600175 PHARM REV CODE 636 W HCPCS: Performed by: INTERNAL MEDICINE

## 2019-03-24 PROCEDURE — 80053 COMPREHEN METABOLIC PANEL: CPT

## 2019-03-24 PROCEDURE — 25000003 PHARM REV CODE 250: Performed by: EMERGENCY MEDICINE

## 2019-03-24 PROCEDURE — 85025 COMPLETE CBC W/AUTO DIFF WBC: CPT

## 2019-03-24 PROCEDURE — 80202 ASSAY OF VANCOMYCIN: CPT

## 2019-03-24 PROCEDURE — 25000003 PHARM REV CODE 250: Performed by: INTERNAL MEDICINE

## 2019-03-24 PROCEDURE — 11000001 HC ACUTE MED/SURG PRIVATE ROOM

## 2019-03-24 PROCEDURE — S4991 NICOTINE PATCH NONLEGEND: HCPCS | Performed by: INTERNAL MEDICINE

## 2019-03-24 PROCEDURE — 36415 COLL VENOUS BLD VENIPUNCTURE: CPT

## 2019-03-24 RX ORDER — METOCLOPRAMIDE HYDROCHLORIDE 5 MG/ML
10 INJECTION INTRAMUSCULAR; INTRAVENOUS EVERY 10 MIN PRN
Status: CANCELLED | OUTPATIENT
Start: 2019-03-24

## 2019-03-24 RX ORDER — SODIUM CHLORIDE 0.9 % (FLUSH) 0.9 %
3 SYRINGE (ML) INJECTION EVERY 8 HOURS
Status: CANCELLED | OUTPATIENT
Start: 2019-03-24

## 2019-03-24 RX ORDER — SODIUM CHLORIDE 0.9 % (FLUSH) 0.9 %
3 SYRINGE (ML) INJECTION
Status: DISCONTINUED | OUTPATIENT
Start: 2019-03-24 | End: 2019-03-27 | Stop reason: HOSPADM

## 2019-03-24 RX ORDER — OXYCODONE HYDROCHLORIDE 5 MG/1
5 TABLET ORAL
Status: CANCELLED | OUTPATIENT
Start: 2019-03-24

## 2019-03-24 RX ORDER — MEPERIDINE HYDROCHLORIDE 50 MG/ML
12.5 INJECTION INTRAMUSCULAR; INTRAVENOUS; SUBCUTANEOUS ONCE AS NEEDED
Status: CANCELLED | OUTPATIENT
Start: 2019-03-24 | End: 2019-03-25

## 2019-03-24 RX ORDER — MORPHINE SULFATE 4 MG/ML
2 INJECTION, SOLUTION INTRAMUSCULAR; INTRAVENOUS EVERY 5 MIN PRN
Status: CANCELLED | OUTPATIENT
Start: 2019-03-24

## 2019-03-24 RX ADMIN — METOPROLOL TARTRATE 50 MG: 50 TABLET ORAL at 08:03

## 2019-03-24 RX ADMIN — METOPROLOL TARTRATE 50 MG: 50 TABLET ORAL at 09:03

## 2019-03-24 RX ADMIN — FAMOTIDINE 20 MG: 20 TABLET, FILM COATED ORAL at 09:03

## 2019-03-24 RX ADMIN — PIPERACILLIN SODIUM AND TAZOBACTAM SODIUM 4.5 G: 4; .5 INJECTION, POWDER, LYOPHILIZED, FOR SOLUTION INTRAVENOUS at 05:03

## 2019-03-24 RX ADMIN — OXYCODONE AND ACETAMINOPHEN 1 TABLET: 10; 325 TABLET ORAL at 11:03

## 2019-03-24 RX ADMIN — LISINOPRIL 10 MG: 10 TABLET ORAL at 09:03

## 2019-03-24 RX ADMIN — FAMOTIDINE 20 MG: 20 TABLET, FILM COATED ORAL at 08:03

## 2019-03-24 RX ADMIN — OXYCODONE AND ACETAMINOPHEN 1 TABLET: 10; 325 TABLET ORAL at 05:03

## 2019-03-24 RX ADMIN — PIPERACILLIN SODIUM AND TAZOBACTAM SODIUM 4.5 G: 4; .5 INJECTION, POWDER, LYOPHILIZED, FOR SOLUTION INTRAVENOUS at 12:03

## 2019-03-24 RX ADMIN — AMLODIPINE BESYLATE 10 MG: 10 TABLET ORAL at 09:03

## 2019-03-24 RX ADMIN — PIPERACILLIN SODIUM AND TAZOBACTAM SODIUM 4.5 G: 4; .5 INJECTION, POWDER, LYOPHILIZED, FOR SOLUTION INTRAVENOUS at 08:03

## 2019-03-24 RX ADMIN — NICOTINE 1 PATCH: 21 PATCH, EXTENDED RELEASE TRANSDERMAL at 09:03

## 2019-03-24 RX ADMIN — VANCOMYCIN HYDROCHLORIDE 1500 MG: 100 INJECTION, POWDER, LYOPHILIZED, FOR SOLUTION INTRAVENOUS at 04:03

## 2019-03-24 NOTE — PROGRESS NOTES
Pharmacy Vancomycin Consult Note    WBC=9.88  Tmax=98.6  CrCl=74.9ml/min Scr=1.4 (up from 1.3)     Cultures: blood-gram (+) cocci x1  Dx. Cellulitis/possible Osteo (per MRI) --I&D planned for today per MD note  Goal trough-15-20mcg/ml    Vancomycin trough=14.4mcg/ml  Continue current dose: Vancomycin 1500mg Q18  Trough due 3/26 @0230    Thank you for allowing us to participate in this patient's care.  Annabel Noe, Pharm D 3/24/2019 3:42 PM

## 2019-03-24 NOTE — PROGRESS NOTES
[facility] Hospital Medicine  Progress Note    Patient Name: Jonh Najera  MRN: [id number]  Patient Class: IP- Inpatient   Admission Date: 3/22/2019  Length of Stay: 2 days  Attending Physician: Faustino Schaefer, *  Primary Care Provider: Provider Notinsystem        Subjective:     Principal Problem:Cellulitis and abscess of toe, right    HPI:  Mr. Najera is a 43-year-old  male with PMH significant for gouty arthritis, followed by rheumatologist, Dr. Odom, HTN, presents to the ED complaining of right 1st metatarsal joint wound that started around 4-5 days ago.  Patient denies fever, chills.  In the ED he is found to have a purulent appearing large wound of the right medial 1st metatarsal joint.  WBC 47438.  3% bands.  Afebrile.  HR 88-97.  Lactic acid pending.  .  X-ray of the right foot reveals arthritic type changes with possibilities of erosion involving the 1st metatarsal joint, with associated large amount of soft tissue swelling/cellulitis.  Patient started on IV vancomycin, IV Zosyn.  MRI right foot pending to rule out osteomyelitis.    Hospital Course:  42 y/o wm admitted with a dx of right toe cellutis and  An abscess . He was started on IVAB . The MRI show  Cellulitis and  Small fluid collection . Podiatrist  was consulted and plan for I&D  Tomorrow .  3/24 Pt was seen and examined at bedside . There was no acute event overnight .  He is schedule for I&D today . The blood cx are positive for GPC     Interval History:     Review of Systems   Constitutional: Negative.  Negative for appetite change, fatigue and fever.   HENT: Negative.  Negative for congestion, nosebleeds and sore throat.    Eyes: Negative.  Negative for visual disturbance.   Respiratory: Negative.  Negative for cough, shortness of breath and wheezing.    Cardiovascular: Negative.  Negative for chest pain, palpitations and leg swelling.   Gastrointestinal: Negative.  Negative for abdominal  pain, constipation, diarrhea, nausea and vomiting.   Endocrine: Negative.  Negative for polyuria.   Genitourinary: Negative.  Negative for dysuria, flank pain, frequency and urgency.   Musculoskeletal: Negative.  Negative for arthralgias, back pain and joint swelling.   Skin: Positive for color change and wound (right first/great toe). Negative for pallor and rash.   Allergic/Immunologic: Negative.  Negative for immunocompromised state.   Neurological: Negative.  Negative for dizziness, syncope, weakness, light-headedness, numbness and headaches.   Hematological: Negative.    Psychiatric/Behavioral: Negative.  Negative for confusion and hallucinations. The patient is not nervous/anxious.    All other systems reviewed and are negative.    Objective:     Vital Signs (Most Recent):  Temp: 98 °F (36.7 °C) (03/24/19 1218)  Pulse: 61 (03/24/19 1218)  Resp: 16 (03/24/19 1218)  BP: 101/65 (03/24/19 1218)  SpO2: 100 % (03/24/19 1218) Vital Signs (24h Range):  Temp:  [97.5 °F (36.4 °C)-98.1 °F (36.7 °C)] 98 °F (36.7 °C)  Pulse:  [58-89] 61  Resp:  [16-18] 16  SpO2:  [95 %-100 %] 100 %  BP: ()/(58-91) 101/65     Weight: 97.3 kg (214 lb 8.1 oz)  Body mass index is 33.6 kg/m².    Intake/Output Summary (Last 24 hours) at 3/24/2019 1413  Last data filed at 3/24/2019 1207  Gross per 24 hour   Intake 1548.33 ml   Output 950 ml   Net 598.33 ml      Physical Exam   Constitutional: He is oriented to person, place, and time. He appears well-developed and well-nourished. No distress.   HENT:   Head: Normocephalic and atraumatic.   Eyes: Conjunctivae and EOM are normal. No scleral icterus.   Neck: Normal range of motion. Neck supple. No thyromegaly present.   Cardiovascular: Normal rate, regular rhythm and normal heart sounds.   No murmur heard.  Pulmonary/Chest: Effort normal and breath sounds normal. No respiratory distress. He has no wheezes. He exhibits no tenderness.   Abdominal: Soft. Bowel sounds are normal. There is no  tenderness.   Musculoskeletal: Normal range of motion. He exhibits deformity. He exhibits no edema or tenderness.   Lymphadenopathy:     He has no cervical adenopathy.   Neurological: He is alert and oriented to person, place, and time. No cranial nerve deficit. He exhibits normal muscle tone. Coordination normal.   Skin: Skin is warm and dry. He is not diaphoretic. No erythema.   Covered    Psychiatric: He has a normal mood and affect. His behavior is normal. Thought content normal.   Nursing note and vitals reviewed.      Significant Labs: All pertinent labs within the past 24 hours have been reviewed.    Significant Imaging: I have reviewed all pertinent imaging results/findings within the past 24 hours.    Assessment/Plan:      * Cellulitis and abscess of toe, right  Large open wound/ulcer right medial great toe with purulence noted.  Cont  IV vancomycin and  IV Zosyn   Follow up on blood and wound cultures.  Wound Care consulted.  MRI right foot show  cellulitis , small fluid collection ( abscess ) and possible osteo   Podiatrist consulted . Plan for  I&D  Today   ID consulted       Chronic gouty arthritis  Acute gout flare up superimpose with an infection   Patient reports taking allopurinol on a daily basis.  Add HSAI PRN   Followed by rheumatologist, Dr. Odom in the [facility].      Bacteremia due to Gram-positive bacteria  2/2 to above   Cont current  tx   F/U final result   Cont current IVAB       Hypertension  Continue home dose lisinopril and amlodipine.  Avoid HCTZ.  Monitor blood pressure closely and make adjustments as needed.        VTE Risk Mitigation (From admission, onward)        Ordered     Place MOLLY hose  Until discontinued      03/22/19 4043              Faustino Schaefer MD  Department of Hospital Medicine   [facility]

## 2019-03-24 NOTE — PROGRESS NOTES
Subjective:     Patient ID: Jonh Najera is a 43 y.o. male.    Chief Complaint: Abscess (abscess/sore to R great toe from gout for about 4 days)    HPI: This 43 year old male presents for surgical management of painful right foot infection. Patient states deformity has been present for years and recent flare up started about 2-3 weeks ago. Patient states deformity has not responded to conservative treatment.      Patient Active Problem List   Diagnosis    Gout flare    Arthritis    Hypertension    Chronic pancreatitis    Cellulitis and abscess of elbow    Cellulitis of foot, right    Streptococcal bacteremia    Drug abuse    Bacteremia due to Gram-positive bacteria    Cellulitis of elbow    Cellulitis and abscess of toe, right    Chronic gouty arthritis       Current Discharge Medication List      CONTINUE these medications which have NOT CHANGED    Details   colchicine 0.6 mg tablet 1 tablet PO at the first sign of a gout flare then 1 tablet PO 1 hour later.  Do not exceed 1.2 mg in a 24 hour period  Qty: 18 tablet, Refills: 0      indomethacin (INDOCIN) 25 MG capsule Take 1 capsule (25 mg total) by mouth 3 (three) times daily as needed (pain). Take with food  Qty: 12 capsule, Refills: 0      lisinopril 10 MG tablet Take 10 mg by mouth once daily.      metoprolol tartrate (LOPRESSOR) 50 MG tablet Take 50 mg by mouth 2 (two) times daily.             Review of patient's allergies indicates:   Allergen Reactions    Compazine [prochlorperazine edisylate] Other (See Comments)     Muscle spasms    Inapsine [droperidol] Other (See Comments)     Muscle spasms       Past Surgical History:   Procedure Laterality Date    arm surgery      CHOLECYSTECTOMY      FACIAL RECONSTRUCTION SURGERY      FOOT SURGERY         Family History   Problem Relation Age of Onset    Diabetes Mother     Hypertension Mother     Heart disease Mother     Diabetes Father     Hypertension Father     Heart disease Father      Diabetes Paternal Grandmother     Hypertension Paternal Grandmother        Social History     Socioeconomic History    Marital status:      Spouse name: Not on file    Number of children: Not on file    Years of education: Not on file    Highest education level: Not on file   Occupational History    Not on file   Social Needs    Financial resource strain: Not on file    Food insecurity:     Worry: Not on file     Inability: Not on file    Transportation needs:     Medical: Not on file     Non-medical: Not on file   Tobacco Use    Smoking status: Current Every Day Smoker     Packs/day: 0.50     Types: Cigarettes    Smokeless tobacco: Never Used   Substance and Sexual Activity    Alcohol use: No    Drug use: Yes     Types: Amphetamines, Methamphetamines, IV     Comment: Pt is using IV heroine, methamphetamines, cocaine, and opana    Sexual activity: Yes     Partners: Female   Lifestyle    Physical activity:     Days per week: Not on file     Minutes per session: Not on file    Stress: Not on file   Relationships    Social connections:     Talks on phone: Not on file     Gets together: Not on file     Attends [employer] service: Not on file     Active member of club or organization: Not on file     Attends meetings of clubs or organizations: Not on file     Relationship status: Not on file    Intimate partner violence:     Fear of current or ex partner: Not on file     Emotionally abused: Not on file     Physically abused: Not on file     Forced sexual activity: Not on file   Other Topics Concern    Not on file   Social History Narrative    Not on file       Vitals:    03/24/19 0409 03/24/19 0744 03/24/19 1218 03/24/19 1629   BP: 118/83 118/82 101/65 111/72   Pulse: 65 61 61 60   Resp: 18 16 16 18   Temp: 97.8 °F (36.6 °C) 98.1 °F (36.7 °C) 98 °F (36.7 °C) 98.8 °F (37.1 °C)   TempSrc: Oral Oral Oral Oral   SpO2: 100% 95% 100% 96%   Weight:       Height:           Review of Systems    Constitutional: Negative for chills and fever.   Respiratory: Negative for shortness of breath.    Cardiovascular: Negative for chest pain, palpitations, orthopnea, claudication and leg swelling.   Gastrointestinal: Negative for diarrhea, nausea and vomiting.   Musculoskeletal: Positive for joint pain (right 1st mpj).   Skin: Negative for rash.   Neurological: Negative for dizziness, tingling, sensory change, focal weakness and weakness.   Psychiatric/Behavioral: Negative.              Objective:      PHYSICAL EXAM: Apperance: Alert and orient in no distress,well developed, and with good attention to grooming and body habits  Lower Extremity Exam  VASCULAR: Dorsalis pedis pulses 2/4 right and Posterior Tibial pulses 2/4 right. Capillary fill time <4 seconds right. Severe edema observed right. Varicosities absent right. Skin temperature of the lower extremities is warm to warm, proximal to distal. Hair growth WNL right. (--) lymphangitis or (+) cellulitis noted right.  DERMATOLOGICAL: (+) edema, (+) erythema, (--) malodor, (+) serosanguinous drainage, (+) warmth to right foot.  Ulcer noted to right medial 1st MPJ with fibrotic base and with a 1 mm yellow/white border.  The dorsum surface of the feet are soft and supple.  The plantar aspects of feet are dry and scaly. Webspaces 1,2,3,4 clean, dry and without evidence of break in skin integrity right.   NEUROLOGICAL: Light touch, sharp-dull, proprioception all present and equal bilaterally.    MUSCULOSKELETAL: Muscle strength is 5/5 for foot inverters, everters, plantarflexors, and dorsiflexors. Muscle tone is normal. Moderate gout arthritic changes noted to right foot and toes.           Assessment:       Osteomyelitis of right foot, unspecified type  -     Case Request Operating Room: INCISION AND DRAINAGE, FOOT, EXCISIONAL BIOPSY; Standing    Cellulitis and abscess of foot  -     X-Ray Foot Complete Right; Standing  -     Case Request Operating Room: INCISION AND  DRAINAGE, FOOT, EXCISIONAL BIOPSY; Standing    Chronic gouty arthritis  -     Case Request Operating Room: INCISION AND DRAINAGE, FOOT, EXCISIONAL BIOPSY; Standing    Other orders  -     HIV 1/2 Ag/Ab (4th Gen); Standing  -     CBC auto differential; Standing  -     Comprehensive metabolic panel; Standing  -     Blood culture #1 **CANNOT BE ORDERED STAT**; Standing  -     Blood culture #2 **CANNOT BE ORDERED STAT**; Standing  -     Insert Saline lock IV; Standing  -     sodium chloride 0.9% bolus 500 mL  -     C-reactive protein; Standing  -     vancomycin in dextrose 5 % 1 gram/250 mL IVPB 1,000 mg  -     piperacillin-tazobactam 4.5 g in dextrose 5 % 100 mL IVPB (ready to mix system)  -     Pharmacy to dose Vancomycin consult; Standing  -     Discontinue: vancomycin in dextrose 5 % 1 gram/250 mL IVPB 1,000 mg  -     Cancel: MRI Foot Toes Without Contrast Right; Standing  -     Cancel: MRI Foot Toes Without Contrast Right; Standing  -     MRI Foot Toes W WO Contrast Right; Standing  -     Full code; Standing  -     Vital signs; Standing  -     Log roll; Standing  -     Intake and output; Standing  -     Notify physician ; Standing  -     sodium chloride 0.9% flush 5 mL  -     Discontinue: ondansetron injection 4 mg  -     promethazine (PHENERGAN) 6.25 mg in dextrose 5 % 50 mL IVPB  -     Discontinue: acetaminophen tablet 650 mg  -     Pulse Oximetry Q4H; Standing  -     Admit to Inpatient; Standing  -     Progressive Mobility Protocol (mobilize patient to their highest level of functioning at least twice daily); Standing  -     Cancel: Diet Adult Regular (IDDSI Level 7); Standing  -     Place MOLLY hose; Standing  -     piperacillin-tazobactam 4.5 g in dextrose 5 % 100 mL IVPB (ready to mix system)  -     Discontinue: vancomycin (VANCOCIN) 15 mg/kg in dextrose 5 % 500 mL IVPB  -     Cancel: CBC auto differential; Standing  -     Cancel: Comprehensive metabolic panel; Standing  -     CBC auto differential; Standing  -      Comprehensive metabolic panel; Standing  -     0.9%  NaCl infusion  -     ondansetron injection 4 mg  -     acetaminophen tablet 650 mg  -     oxyCODONE-acetaminophen 5-325 mg per tablet 1 tablet  -     oxyCODONE-acetaminophen  mg per tablet 1 tablet  -     famotidine tablet 20 mg  -     guaifenesin 100 mg/5 ml syrup 200 mg  -     aluminum-magnesium hydroxide-simethicone 200-200-20 mg/5 mL suspension 30 mL  -     albuterol-ipratropium 2.5 mg-0.5 mg/3 mL nebulizer solution 3 mL  -     Inhalation Treatment Q4H PRN; Standing  -     diphenhydrAMINE capsule 25 mg  -     hydrALAZINE injection 10 mg  -     Inpatient consult to Wound Care; Standing  -     nicotine 21 mg/24 hr 1 patch  -     lisinopril tablet 10 mg  -     metoprolol tartrate (LOPRESSOR) tablet 50 mg  -     amLODIPine tablet 10 mg  -     Cancel: Lactic acid, plasma; Standing  -     Culture, Anaerobe; Standing  -     Aerobic culture; Standing  -     gadobutrol 10 mL  -     Cancel: Lactic acid, plasma; Standing  -     Lactic acid, plasma; Standing  -     Lactic acid, plasma; Standing  -     Inpatient consult to Wound Care; Standing  -     Discontinue: vancomycin 1.5 g in 5 % dextrose 250 mL IVPB  -     Cancel: VANCOMYCIN, TROUGH before 4th dose; Standing  -     vancomycin 1.5 g in 5 % dextrose 250 mL IVPB  -     VANCOMYCIN, TROUGH before next dose; Standing  -     Inpatient consult to Podiatry; Standing  -     Inpatient consult to Infectious Diseases; Standing  -     Diet NPO; Standing  -     ketorolac injection 15 mg  -     VANCOMYCIN, TROUGH before next dose; Standing  -     Notify Physician - Potential Need of Opioid Reversal; Standing  -     maintenance fluids per service; Standing  -     sodium chloride 0.9% flush 3 mL  -     VANCOMYCIN, TROUGH before 3rd dose; Standing            Plan:   Osteomyelitis of right foot, unspecified type  -     Case Request Operating Room: INCISION AND DRAINAGE, FOOT, EXCISIONAL BIOPSY; Standing    Cellulitis and  abscess of foot  -     X-Ray Foot Complete Right; Standing  -     Case Request Operating Room: INCISION AND DRAINAGE, FOOT, EXCISIONAL BIOPSY; Standing    Chronic gouty arthritis  -     Case Request Operating Room: INCISION AND DRAINAGE, FOOT, EXCISIONAL BIOPSY; Standing    Other orders  -     HIV 1/2 Ag/Ab (4th Gen); Standing  -     CBC auto differential; Standing  -     Comprehensive metabolic panel; Standing  -     Blood culture #1 **CANNOT BE ORDERED STAT**; Standing  -     Blood culture #2 **CANNOT BE ORDERED STAT**; Standing  -     Insert Saline lock IV; Standing  -     sodium chloride 0.9% bolus 500 mL  -     C-reactive protein; Standing  -     vancomycin in dextrose 5 % 1 gram/250 mL IVPB 1,000 mg  -     piperacillin-tazobactam 4.5 g in dextrose 5 % 100 mL IVPB (ready to mix system)  -     Pharmacy to dose Vancomycin consult; Standing  -     Discontinue: vancomycin in dextrose 5 % 1 gram/250 mL IVPB 1,000 mg  -     Cancel: MRI Foot Toes Without Contrast Right; Standing  -     Cancel: MRI Foot Toes Without Contrast Right; Standing  -     MRI Foot Toes W WO Contrast Right; Standing  -     Full code; Standing  -     Vital signs; Standing  -     Log roll; Standing  -     Intake and output; Standing  -     Notify physician ; Standing  -     sodium chloride 0.9% flush 5 mL  -     Discontinue: ondansetron injection 4 mg  -     promethazine (PHENERGAN) 6.25 mg in dextrose 5 % 50 mL IVPB  -     Discontinue: acetaminophen tablet 650 mg  -     Pulse Oximetry Q4H; Standing  -     Admit to Inpatient; Standing  -     Progressive Mobility Protocol (mobilize patient to their highest level of functioning at least twice daily); Standing  -     Cancel: Diet Adult Regular (IDDSI Level 7); Standing  -     Place MOLLY hose; Standing  -     piperacillin-tazobactam 4.5 g in dextrose 5 % 100 mL IVPB (ready to mix system)  -     Discontinue: vancomycin (VANCOCIN) 15 mg/kg in dextrose 5 % 500 mL IVPB  -     Cancel: CBC auto  differential; Standing  -     Cancel: Comprehensive metabolic panel; Standing  -     CBC auto differential; Standing  -     Comprehensive metabolic panel; Standing  -     0.9%  NaCl infusion  -     ondansetron injection 4 mg  -     acetaminophen tablet 650 mg  -     oxyCODONE-acetaminophen 5-325 mg per tablet 1 tablet  -     oxyCODONE-acetaminophen  mg per tablet 1 tablet  -     famotidine tablet 20 mg  -     guaifenesin 100 mg/5 ml syrup 200 mg  -     aluminum-magnesium hydroxide-simethicone 200-200-20 mg/5 mL suspension 30 mL  -     albuterol-ipratropium 2.5 mg-0.5 mg/3 mL nebulizer solution 3 mL  -     Inhalation Treatment Q4H PRN; Standing  -     diphenhydrAMINE capsule 25 mg  -     hydrALAZINE injection 10 mg  -     Inpatient consult to Wound Care; Standing  -     nicotine 21 mg/24 hr 1 patch  -     lisinopril tablet 10 mg  -     metoprolol tartrate (LOPRESSOR) tablet 50 mg  -     amLODIPine tablet 10 mg  -     Cancel: Lactic acid, plasma; Standing  -     Culture, Anaerobe; Standing  -     Aerobic culture; Standing  -     gadobutrol 10 mL  -     Cancel: Lactic acid, plasma; Standing  -     Lactic acid, plasma; Standing  -     Lactic acid, plasma; Standing  -     Inpatient consult to Wound Care; Standing  -     Discontinue: vancomycin 1.5 g in 5 % dextrose 250 mL IVPB  -     Cancel: VANCOMYCIN, TROUGH before 4th dose; Standing  -     vancomycin 1.5 g in 5 % dextrose 250 mL IVPB  -     VANCOMYCIN, TROUGH before next dose; Standing  -     Inpatient consult to Podiatry; Standing  -     Inpatient consult to Infectious Diseases; Standing  -     Diet NPO; Standing  -     ketorolac injection 15 mg  -     VANCOMYCIN, TROUGH before next dose; Standing  -     Notify Physician - Potential Need of Opioid Reversal; Standing  -     maintenance fluids per service; Standing  -     sodium chloride 0.9% flush 3 mL  -     VANCOMYCIN, TROUGH before 3rd dose; Standing      I counseled the patient on his conditions, regarding  "findings of my examination, my impressions, and usual treatment plan.   Consent reviewed and signed with patient .   Patient to OR 03/25/19 for surgical management for painful infected right foot. All concerns and questions answered by myself, Dr. Frank DPM. No gurantees given nor implied.     Patient was scheduled for surgery yesterday 3/24/19 but ate a "snickers bar" 30 minutes before procedure.               Piter Marie DPM  [facility]     "

## 2019-03-24 NOTE — ANESTHESIA PREPROCEDURE EVALUATION
Gout flare    Arthritis    Hypertension    Chronic pancreatitis    Cellulitis and abscess of elbow    Cellulitis of foot, right    Streptococcal bacteremia    Drug abuse    SIRS (systemic inflammatory response syndrome)    Cellulitis of elbow    Cellulitis and abscess of toe, right    Chronic gouty arthritis                                                                                                       03/24/2019  Jonh Najera is a 43 y.o., male.    Anesthesia Evaluation    I have reviewed the Patient Summary Reports.    I have reviewed the Nursing Notes.   I have reviewed the Medications.     Review of Systems  Anesthesia Hx:  No problems with previous Anesthesia  History of prior surgery of interest to airway management or planning: Denies Family Hx of Anesthesia complications.   Denies Personal Hx of Anesthesia complications.   Social:  Smoker Hx amphetamine and narcotic substance abuse.   Hematology/Oncology:     Oncology Normal     Cardiovascular:   Hypertension ECG has been reviewed.    Pulmonary:  Pulmonary Normal    Hepatic/GI:   Liver Disease, Hepatitis, C Hx pancreatitis   Musculoskeletal:   Arthritis  Gout with cellulitis and abscess right foot   Neurological:  Neurology Normal    Endocrine:  Endocrine Normal    Psych:  Psychiatric Normal           Physical Exam  General:  Well nourished    Airway/Jaw/Neck:  Airway Findings: Mouth Opening: Normal Tongue: Normal  General Airway Assessment: Adult  Mallampati: II  TM Distance: Normal, at least 6 cm         Dental:  Dental Findings: Periodontal disease, Severe   Chest/Lungs:  Chest/Lungs Findings: Normal Respiratory Rate     Heart/Vascular:  Heart Findings: Rate: Normal             Anesthesia Plan  Type of Anesthesia, risks & benefits discussed:  Anesthesia Type:  MAC  Patient's Preference:   Intra-op Monitoring Plan: standard ASA monitors  Intra-op Monitoring Plan Comments:   Post Op Pain Control Plan: multimodal  analgesia  Post Op Pain Control Plan Comments:   Induction:   IV  Beta Blocker:  Patient is on a Beta-Blocker and has received one dose within the past 24 hours (No further documentation required).       Informed Consent: Patient understands risks and agrees with Anesthesia plan.  Questions answered. Anesthesia consent signed with patient.  ASA Score: 3     Day of Surgery Review of History & Physical: I have interviewed and examined the patient. I have reviewed the patient's H&P dated:    H&P update referred to the surgeon.         Ready For Surgery From Anesthesia Perspective.

## 2019-03-24 NOTE — PLAN OF CARE
Problem: Adult Inpatient Plan of Care  Goal: Plan of Care Review  Outcome: Ongoing (interventions implemented as appropriate)  Patient doing well this shift.  He has been NPO since midnight for planned surgical procedure.  IVF and antibiotics infusing as ordered.  Vital signs stable.  Pain controlled.  No acute distress noted.  Will continue to monitor. 24 hour chart check performed.

## 2019-03-24 NOTE — ASSESSMENT & PLAN NOTE
Large open wound/ulcer right medial great toe with purulence noted.  Cont  IV vancomycin and  IV Zosyn   Follow up on blood and wound cultures.  Wound Care consulted.  MRI right foot show  cellulitis , small fluid collection ( abscess ) and possible osteo   Podiatrist consulted . Plan for  I&D  Today   ID consulted

## 2019-03-24 NOTE — INTERVAL H&P NOTE
The patient has been examined and the H&P has been reviewed:    I concur with the findings and no changes have occurred since H&P was written.    Anesthesia/Surgery risks, benefits and alternative options discussed and understood by patient/family.          Active Hospital Problems    Diagnosis  POA    *Cellulitis and abscess of toe, right [L03.031, L02.611]  Yes    Chronic gouty arthritis [M1A.00X0]  Yes    Bacteremia due to Gram-positive bacteria [R78.81]  Yes    Hypertension [I10]  Yes      Resolved Hospital Problems   No resolved problems to display.

## 2019-03-24 NOTE — SUBJECTIVE & OBJECTIVE
Interval History:     Review of Systems   Constitutional: Negative.  Negative for appetite change, fatigue and fever.   HENT: Negative.  Negative for congestion, nosebleeds and sore throat.    Eyes: Negative.  Negative for visual disturbance.   Respiratory: Negative.  Negative for cough, shortness of breath and wheezing.    Cardiovascular: Negative.  Negative for chest pain, palpitations and leg swelling.   Gastrointestinal: Negative.  Negative for abdominal pain, constipation, diarrhea, nausea and vomiting.   Endocrine: Negative.  Negative for polyuria.   Genitourinary: Negative.  Negative for dysuria, flank pain, frequency and urgency.   Musculoskeletal: Negative.  Negative for arthralgias, back pain and joint swelling.   Skin: Positive for color change and wound (right first/great toe). Negative for pallor and rash.   Allergic/Immunologic: Negative.  Negative for immunocompromised state.   Neurological: Negative.  Negative for dizziness, syncope, weakness, light-headedness, numbness and headaches.   Hematological: Negative.    Psychiatric/Behavioral: Negative.  Negative for confusion and hallucinations. The patient is not nervous/anxious.    All other systems reviewed and are negative.    Objective:     Vital Signs (Most Recent):  Temp: 98 °F (36.7 °C) (03/24/19 1218)  Pulse: 61 (03/24/19 1218)  Resp: 16 (03/24/19 1218)  BP: 101/65 (03/24/19 1218)  SpO2: 100 % (03/24/19 1218) Vital Signs (24h Range):  Temp:  [97.5 °F (36.4 °C)-98.1 °F (36.7 °C)] 98 °F (36.7 °C)  Pulse:  [58-89] 61  Resp:  [16-18] 16  SpO2:  [95 %-100 %] 100 %  BP: ()/(58-91) 101/65     Weight: 97.3 kg (214 lb 8.1 oz)  Body mass index is 33.6 kg/m².    Intake/Output Summary (Last 24 hours) at 3/24/2019 1413  Last data filed at 3/24/2019 1207  Gross per 24 hour   Intake 1548.33 ml   Output 950 ml   Net 598.33 ml      Physical Exam   Constitutional: He is oriented to person, place, and time. He appears well-developed and well-nourished. No  distress.   HENT:   Head: Normocephalic and atraumatic.   Eyes: Conjunctivae and EOM are normal. No scleral icterus.   Neck: Normal range of motion. Neck supple. No thyromegaly present.   Cardiovascular: Normal rate, regular rhythm and normal heart sounds.   No murmur heard.  Pulmonary/Chest: Effort normal and breath sounds normal. No respiratory distress. He has no wheezes. He exhibits no tenderness.   Abdominal: Soft. Bowel sounds are normal. There is no tenderness.   Musculoskeletal: Normal range of motion. He exhibits deformity. He exhibits no edema or tenderness.   Lymphadenopathy:     He has no cervical adenopathy.   Neurological: He is alert and oriented to person, place, and time. No cranial nerve deficit. He exhibits normal muscle tone. Coordination normal.   Skin: Skin is warm and dry. He is not diaphoretic. No erythema.   Covered    Psychiatric: He has a normal mood and affect. His behavior is normal. Thought content normal.   Nursing note and vitals reviewed.      Significant Labs: All pertinent labs within the past 24 hours have been reviewed.    Significant Imaging: I have reviewed all pertinent imaging results/findings within the past 24 hours.

## 2019-03-25 PROBLEM — M86.9 OSTEOMYELITIS OF RIGHT FOOT: Status: ACTIVE | Noted: 2019-03-25

## 2019-03-25 LAB
DIASTOLIC DYSFUNCTION: NO
HIV 1+2 AB+HIV1 P24 AG SERPL QL IA: NEGATIVE
RETIRED EF AND QEF - SEE NOTES: 60 (ref 55–65)

## 2019-03-25 PROCEDURE — S4991 NICOTINE PATCH NONLEGEND: HCPCS | Performed by: PODIATRIST

## 2019-03-25 PROCEDURE — 25000003 PHARM REV CODE 250: Performed by: EMERGENCY MEDICINE

## 2019-03-25 PROCEDURE — 63600175 PHARM REV CODE 636 W HCPCS: Performed by: EMERGENCY MEDICINE

## 2019-03-25 PROCEDURE — 71000033 HC RECOVERY, INTIAL HOUR: Performed by: PODIATRIST

## 2019-03-25 PROCEDURE — 88305 TISSUE SPECIMEN TO PATHOLOGY - SURGERY: ICD-10-PCS | Mod: 26,,, | Performed by: PATHOLOGY

## 2019-03-25 PROCEDURE — 28001 DRAINAGE OF BURSA OF FOOT: CPT | Mod: RT,,, | Performed by: PODIATRIST

## 2019-03-25 PROCEDURE — 25000003 PHARM REV CODE 250: Performed by: INTERNAL MEDICINE

## 2019-03-25 PROCEDURE — 37000008 HC ANESTHESIA 1ST 15 MINUTES: Performed by: PODIATRIST

## 2019-03-25 PROCEDURE — 27201423 OPTIME MED/SURG SUP & DEVICES STERILE SUPPLY: Performed by: PODIATRIST

## 2019-03-25 PROCEDURE — 63600175 PHARM REV CODE 636 W HCPCS: Performed by: ANESTHESIOLOGY

## 2019-03-25 PROCEDURE — 88311 DECALCIFY TISSUE: CPT | Mod: 26,,, | Performed by: PATHOLOGY

## 2019-03-25 PROCEDURE — 63600175 PHARM REV CODE 636 W HCPCS: Performed by: PODIATRIST

## 2019-03-25 PROCEDURE — 93306 2D ECHO WITH COLOR FLOW DOPPLER: ICD-10-PCS | Mod: 26,,, | Performed by: INTERNAL MEDICINE

## 2019-03-25 PROCEDURE — 20240 PR BIOPSY, BONE, OPEN, EXC; SUPERFICIAL: ICD-10-PCS | Mod: 51,,, | Performed by: PODIATRIST

## 2019-03-25 PROCEDURE — 11000001 HC ACUTE MED/SURG PRIVATE ROOM

## 2019-03-25 PROCEDURE — 36000707: Performed by: PODIATRIST

## 2019-03-25 PROCEDURE — 28001 PR INCIS/DRAINAGE BURSA OF FOOT: ICD-10-PCS | Mod: RT,,, | Performed by: PODIATRIST

## 2019-03-25 PROCEDURE — 88305 TISSUE EXAM BY PATHOLOGIST: CPT | Performed by: PATHOLOGY

## 2019-03-25 PROCEDURE — 63600175 PHARM REV CODE 636 W HCPCS: Performed by: NURSE ANESTHETIST, CERTIFIED REGISTERED

## 2019-03-25 PROCEDURE — 93306 TTE W/DOPPLER COMPLETE: CPT | Mod: 26,,, | Performed by: INTERNAL MEDICINE

## 2019-03-25 PROCEDURE — 25000003 PHARM REV CODE 250: Performed by: PODIATRIST

## 2019-03-25 PROCEDURE — 87070 CULTURE OTHR SPECIMN AEROBIC: CPT

## 2019-03-25 PROCEDURE — 25000003 PHARM REV CODE 250: Performed by: NURSE ANESTHETIST, CERTIFIED REGISTERED

## 2019-03-25 PROCEDURE — 88311 TISSUE SPECIMEN TO PATHOLOGY - SURGERY: ICD-10-PCS | Mod: 26,,, | Performed by: PATHOLOGY

## 2019-03-25 PROCEDURE — S0020 INJECTION, BUPIVICAINE HYDRO: HCPCS | Performed by: PODIATRIST

## 2019-03-25 PROCEDURE — 93306 TTE W/DOPPLER COMPLETE: CPT

## 2019-03-25 PROCEDURE — 87075 CULTR BACTERIA EXCEPT BLOOD: CPT

## 2019-03-25 PROCEDURE — 87077 CULTURE AEROBIC IDENTIFY: CPT

## 2019-03-25 PROCEDURE — 20240 BONE BIOPSY OPEN SUPERFICIAL: CPT | Mod: 51,,, | Performed by: PODIATRIST

## 2019-03-25 PROCEDURE — 36000706: Performed by: PODIATRIST

## 2019-03-25 PROCEDURE — 88305 TISSUE EXAM BY PATHOLOGIST: CPT | Mod: 26,,, | Performed by: PATHOLOGY

## 2019-03-25 PROCEDURE — 37000009 HC ANESTHESIA EA ADD 15 MINS: Performed by: PODIATRIST

## 2019-03-25 PROCEDURE — 87186 SC STD MICRODIL/AGAR DIL: CPT

## 2019-03-25 RX ORDER — SODIUM CHLORIDE, SODIUM LACTATE, POTASSIUM CHLORIDE, CALCIUM CHLORIDE 600; 310; 30; 20 MG/100ML; MG/100ML; MG/100ML; MG/100ML
INJECTION, SOLUTION INTRAVENOUS CONTINUOUS PRN
Status: DISCONTINUED | OUTPATIENT
Start: 2019-03-25 | End: 2019-03-25

## 2019-03-25 RX ORDER — ONDANSETRON 2 MG/ML
INJECTION INTRAMUSCULAR; INTRAVENOUS
Status: DISCONTINUED | OUTPATIENT
Start: 2019-03-25 | End: 2019-03-25

## 2019-03-25 RX ORDER — SODIUM CHLORIDE 0.9 % (FLUSH) 0.9 %
3 SYRINGE (ML) INJECTION EVERY 8 HOURS
Status: DISCONTINUED | OUTPATIENT
Start: 2019-03-25 | End: 2019-03-25 | Stop reason: HOSPADM

## 2019-03-25 RX ORDER — LIDOCAINE HYDROCHLORIDE 10 MG/ML
INJECTION, SOLUTION EPIDURAL; INFILTRATION; INTRACAUDAL; PERINEURAL
Status: DISCONTINUED | OUTPATIENT
Start: 2019-03-25 | End: 2019-03-25 | Stop reason: HOSPADM

## 2019-03-25 RX ORDER — MIDAZOLAM HYDROCHLORIDE 1 MG/ML
INJECTION, SOLUTION INTRAMUSCULAR; INTRAVENOUS
Status: DISCONTINUED | OUTPATIENT
Start: 2019-03-25 | End: 2019-03-25

## 2019-03-25 RX ORDER — BUPIVACAINE HYDROCHLORIDE 5 MG/ML
INJECTION, SOLUTION EPIDURAL; INTRACAUDAL
Status: DISCONTINUED | OUTPATIENT
Start: 2019-03-25 | End: 2019-03-25 | Stop reason: HOSPADM

## 2019-03-25 RX ORDER — ACETAMINOPHEN 10 MG/ML
1000 INJECTION, SOLUTION INTRAVENOUS ONCE
Status: COMPLETED | OUTPATIENT
Start: 2019-03-25 | End: 2019-03-25

## 2019-03-25 RX ORDER — LIDOCAINE HCL/PF 100 MG/5ML
SYRINGE (ML) INTRAVENOUS
Status: DISCONTINUED | OUTPATIENT
Start: 2019-03-25 | End: 2019-03-25

## 2019-03-25 RX ORDER — MEPERIDINE HYDROCHLORIDE 50 MG/ML
12.5 INJECTION INTRAMUSCULAR; INTRAVENOUS; SUBCUTANEOUS ONCE AS NEEDED
Status: DISCONTINUED | OUTPATIENT
Start: 2019-03-25 | End: 2019-03-25 | Stop reason: HOSPADM

## 2019-03-25 RX ORDER — HYDROMORPHONE HYDROCHLORIDE 2 MG/ML
0.2 INJECTION, SOLUTION INTRAMUSCULAR; INTRAVENOUS; SUBCUTANEOUS EVERY 5 MIN PRN
Status: DISCONTINUED | OUTPATIENT
Start: 2019-03-25 | End: 2019-03-25 | Stop reason: HOSPADM

## 2019-03-25 RX ORDER — FENTANYL CITRATE 50 UG/ML
INJECTION, SOLUTION INTRAMUSCULAR; INTRAVENOUS
Status: DISCONTINUED | OUTPATIENT
Start: 2019-03-25 | End: 2019-03-25

## 2019-03-25 RX ORDER — PROPOFOL 10 MG/ML
VIAL (ML) INTRAVENOUS CONTINUOUS PRN
Status: DISCONTINUED | OUTPATIENT
Start: 2019-03-25 | End: 2019-03-25

## 2019-03-25 RX ORDER — SODIUM CHLORIDE 0.9 % (FLUSH) 0.9 %
3 SYRINGE (ML) INJECTION
Status: DISCONTINUED | OUTPATIENT
Start: 2019-03-25 | End: 2019-03-25 | Stop reason: HOSPADM

## 2019-03-25 RX ORDER — KETOROLAC TROMETHAMINE 30 MG/ML
15 INJECTION, SOLUTION INTRAMUSCULAR; INTRAVENOUS 2 TIMES DAILY PRN
Status: DISPENSED | OUTPATIENT
Start: 2019-03-25 | End: 2019-03-26

## 2019-03-25 RX ADMIN — SODIUM CHLORIDE, SODIUM LACTATE, POTASSIUM CHLORIDE, AND CALCIUM CHLORIDE: .6; .31; .03; .02 INJECTION, SOLUTION INTRAVENOUS at 07:03

## 2019-03-25 RX ADMIN — AMLODIPINE BESYLATE 10 MG: 10 TABLET ORAL at 10:03

## 2019-03-25 RX ADMIN — HYDROMORPHONE HYDROCHLORIDE 0.2 MG: 2 INJECTION, SOLUTION INTRAMUSCULAR; INTRAVENOUS; SUBCUTANEOUS at 08:03

## 2019-03-25 RX ADMIN — FENTANYL CITRATE 25 MCG: 50 INJECTION, SOLUTION INTRAMUSCULAR; INTRAVENOUS at 07:03

## 2019-03-25 RX ADMIN — MIDAZOLAM 2 MG: 1 INJECTION INTRAMUSCULAR; INTRAVENOUS at 07:03

## 2019-03-25 RX ADMIN — VANCOMYCIN HYDROCHLORIDE 1500 MG: 100 INJECTION, POWDER, LYOPHILIZED, FOR SOLUTION INTRAVENOUS at 10:03

## 2019-03-25 RX ADMIN — OXYCODONE AND ACETAMINOPHEN 1 TABLET: 10; 325 TABLET ORAL at 10:03

## 2019-03-25 RX ADMIN — METOPROLOL TARTRATE 50 MG: 50 TABLET ORAL at 08:03

## 2019-03-25 RX ADMIN — PROPOFOL 50 MCG/KG/MIN: 10 INJECTION, EMULSION INTRAVENOUS at 07:03

## 2019-03-25 RX ADMIN — METOPROLOL TARTRATE 50 MG: 50 TABLET ORAL at 10:03

## 2019-03-25 RX ADMIN — LIDOCAINE HYDROCHLORIDE 100 MG: 20 INJECTION, SOLUTION INTRAVENOUS at 07:03

## 2019-03-25 RX ADMIN — LISINOPRIL 10 MG: 10 TABLET ORAL at 10:03

## 2019-03-25 RX ADMIN — OXYCODONE AND ACETAMINOPHEN 1 TABLET: 10; 325 TABLET ORAL at 04:03

## 2019-03-25 RX ADMIN — ONDANSETRON 4 MG: 2 INJECTION, SOLUTION INTRAMUSCULAR; INTRAVENOUS at 07:03

## 2019-03-25 RX ADMIN — PIPERACILLIN SODIUM AND TAZOBACTAM SODIUM 4.5 G: 4; .5 INJECTION, POWDER, LYOPHILIZED, FOR SOLUTION INTRAVENOUS at 04:03

## 2019-03-25 RX ADMIN — PIPERACILLIN SODIUM AND TAZOBACTAM SODIUM 4.5 G: 4; .5 INJECTION, POWDER, LYOPHILIZED, FOR SOLUTION INTRAVENOUS at 12:03

## 2019-03-25 RX ADMIN — FAMOTIDINE 20 MG: 20 TABLET, FILM COATED ORAL at 10:03

## 2019-03-25 RX ADMIN — KETOROLAC TROMETHAMINE 15 MG: 30 INJECTION, SOLUTION INTRAMUSCULAR; INTRAVENOUS at 12:03

## 2019-03-25 RX ADMIN — ACETAMINOPHEN 1000 MG: 10 INJECTION, SOLUTION INTRAVENOUS at 08:03

## 2019-03-25 RX ADMIN — PIPERACILLIN SODIUM AND TAZOBACTAM SODIUM 4.5 G: 4; .5 INJECTION, POWDER, LYOPHILIZED, FOR SOLUTION INTRAVENOUS at 08:03

## 2019-03-25 RX ADMIN — NICOTINE 1 PATCH: 21 PATCH, EXTENDED RELEASE TRANSDERMAL at 10:03

## 2019-03-25 RX ADMIN — OXYCODONE AND ACETAMINOPHEN 1 TABLET: 10; 325 TABLET ORAL at 06:03

## 2019-03-25 RX ADMIN — FAMOTIDINE 20 MG: 20 TABLET, FILM COATED ORAL at 08:03

## 2019-03-25 NOTE — TRANSFER OF CARE
"Anesthesia Transfer of Care Note    Patient: Jonh Najera    Procedure(s) Performed: Procedure(s) (LRB):  INCISION AND DRAINAGE, FOOT (Right)  BIOPSY, BONE (Right)    Patient location: PACU    Anesthesia Type: MAC    Transport from OR: Transported from OR on room air with adequate spontaneous ventilation    Post pain: adequate analgesia    Post assessment: no apparent anesthetic complications and tolerated procedure well    Post vital signs: stable    Level of consciousness: awake    Nausea/Vomiting: no nausea/vomiting    Complications: none    Transfer of care protocol was followed      Last vitals:   Visit Vitals  /86 (BP Location: Left arm, Patient Position: Lying)   Pulse 63   Temp 36.9 °C (98.5 °F) (Oral)   Resp 18   Ht 5' 7" (1.702 m)   Wt 97.3 kg (214 lb 8.1 oz)   SpO2 95%   BMI 33.60 kg/m²     "

## 2019-03-25 NOTE — BRIEF OP NOTE
[facility]  Brief Operative Note    SUMMARY     Surgery Date: 3/25/2019     Surgeon(s) and Role:     * Piter Marie DPM - Primary    Assisting Surgeon: None    Pre-op Diagnosis:  Cellulitis and abscess of foot [L03.119, L02.619]  Osteomyelitis of right foot, unspecified type [M86.9]    Post-op Diagnosis:  Post-Op Diagnosis Codes:     * Cellulitis and abscess of foot [L03.119, L02.619]     * Osteomyelitis of right foot, unspecified type [M86.9]    Procedure(s) (LRB):  INCISION AND DRAINAGE, FOOT (Right)  BIOPSY, BONE (Right)    Anesthesia: Local MAC    Description of Procedure: Incision and Drainage with bone biopsy of right 1st MPJ    Description of the findings of the procedure: Abscess and chronic gouty tophi     Estimated Blood Loss: 5 mL         Specimens:   Specimen (12h ago, onward)    Start     Ordered    03/25/19 0804  Specimen to Pathology - Surgery  Once     Comments:  1) First metatarsal head (Perm)DX: Foot infection     Start Status     03/25/19 0804 Collected (03/25/19 0804) Order ID: [id number]       03/25/19 0804 03/25/19 0801  Specimen to Pathology - Surgery  Once     Comments:  1) First metatarsal head (Perm)DX: Foot infection     Start Status     03/25/19 0801 Collected (03/25/19 0813) Order ID: [id number]       03/25/19 0803

## 2019-03-25 NOTE — CONSULTS
Consulted on this 42 y/o M patient due to cellulitis and abscess to right foot with suspected osteo.  Podiatry consult noted, patient is currently in OR with podiatry for I&D.  Defer treatment plan to podiatry.

## 2019-03-25 NOTE — PLAN OF CARE
Problem: Adult Inpatient Plan of Care  Goal: Plan of Care Review  Outcome: Ongoing (interventions implemented as appropriate)  Patient doing okay this shift.  He ate a candy bar after he was told that surgery was coming to get him.  Surgical procedure was postponed until Monday am.  He is NPO currently and stressed not to eat or drink anything until after surgery.  Pain is controlled at present.  Antibiotics infused as ordered.  No acute distress noted.  24 hour chart check completed.  Will continue to monitor.

## 2019-03-25 NOTE — PLAN OF CARE
Problem: Adult Inpatient Plan of Care  Goal: Plan of Care Review  Outcome: Ongoing (interventions implemented as appropriate)  Pt transferred from pacu this morning. No changes since transfer to unit. C/o pain, see MAR for administrations. VSS. Will continue to monitor.

## 2019-03-25 NOTE — ASSESSMENT & PLAN NOTE
Large open wound/ulcer right medial great toe with purulence noted.  Cont  IV vancomycin and  IV Zosyn   Follow up on blood and wound cultures.  Wound Care consulted.  MRI right foot show  cellulitis , small fluid collection ( abscess ) and possible osteo   Podiatrist consulted . S/P   I&D  Today   ID consulted

## 2019-03-25 NOTE — ADDENDUM NOTE
Addendum  created 03/25/19 1216 by Soheila Myrick, DO    Sign clinical note       Navneet Griggs(Attending)

## 2019-03-25 NOTE — SUBJECTIVE & OBJECTIVE
Interval History:     Review of Systems   Constitutional: Negative.  Negative for appetite change, fatigue and fever.   HENT: Negative.  Negative for congestion, nosebleeds and sore throat.    Eyes: Negative.  Negative for visual disturbance.   Respiratory: Negative.  Negative for cough, shortness of breath and wheezing.    Cardiovascular: Negative.  Negative for chest pain, palpitations and leg swelling.   Gastrointestinal: Negative.  Negative for abdominal pain, constipation, diarrhea, nausea and vomiting.   Endocrine: Negative.  Negative for polyuria.   Genitourinary: Negative.  Negative for dysuria, flank pain, frequency and urgency.   Musculoskeletal: Negative.  Negative for arthralgias, back pain and joint swelling.   Skin: Positive for color change and wound (right first/great toe). Negative for pallor and rash.   Allergic/Immunologic: Negative.  Negative for immunocompromised state.   Neurological: Negative.  Negative for dizziness, syncope, weakness, light-headedness, numbness and headaches.   Hematological: Negative.    Psychiatric/Behavioral: Negative.  Negative for confusion and hallucinations. The patient is not nervous/anxious.    All other systems reviewed and are negative.    Objective:     Vital Signs (Most Recent):  Temp: 97.4 °F (36.3 °C) (03/25/19 1603)  Pulse: (!) 58 (03/25/19 1603)  Resp: 18 (03/25/19 1603)  BP: 137/88 (03/25/19 1603)  SpO2: 98 % (03/25/19 1603) Vital Signs (24h Range):  Temp:  [97.3 °F (36.3 °C)-98.5 °F (36.9 °C)] 97.4 °F (36.3 °C)  Pulse:  [58-70] 58  Resp:  [10-19] 18  SpO2:  [95 %-100 %] 98 %  BP: (105-137)/(72-88) 137/88     Weight: 97.3 kg (214 lb 8.1 oz)  Body mass index is 33.6 kg/m².    Intake/Output Summary (Last 24 hours) at 3/25/2019 1818  Last data filed at 3/25/2019 1600  Gross per 24 hour   Intake 1110 ml   Output 2005 ml   Net -895 ml      Physical Exam   Constitutional: He is oriented to person, place, and time. He appears well-developed and well-nourished. No  distress.   HENT:   Head: Normocephalic and atraumatic.   Eyes: Conjunctivae and EOM are normal. No scleral icterus.   Neck: Normal range of motion. Neck supple. No thyromegaly present.   Cardiovascular: Normal rate, regular rhythm and normal heart sounds.   No murmur heard.  Pulmonary/Chest: Effort normal and breath sounds normal. No respiratory distress. He has no wheezes. He exhibits no tenderness.   Abdominal: Soft. Bowel sounds are normal. There is no tenderness.   Musculoskeletal: Normal range of motion. He exhibits deformity. He exhibits no edema or tenderness.   Lymphadenopathy:     He has no cervical adenopathy.   Neurological: He is alert and oriented to person, place, and time. No cranial nerve deficit. He exhibits normal muscle tone. Coordination normal.   Skin: Skin is warm and dry. He is not diaphoretic. No erythema.   Covered    Psychiatric: He has a normal mood and affect. His behavior is normal. Thought content normal.   Nursing note and vitals reviewed.      Significant Labs: All pertinent labs within the past 24 hours have been reviewed.    Significant Imaging: I have reviewed all pertinent imaging results/findings within the past 24 hours.

## 2019-03-25 NOTE — PROGRESS NOTES
[facility] Hospital Medicine  Progress Note    Patient Name: Jonh Najera  MRN: [id number]  Patient Class: IP- Inpatient   Admission Date: 3/22/2019  Length of Stay: 3 days  Attending Physician: Faustino Schaefer, *  Primary Care Provider: Provider [facility]        Subjective:     Principal Problem:Cellulitis and abscess of toe, right    HPI:  Mr. Najera is a 43-year-old  male with PMH significant for gouty arthritis, followed by rheumatologist, Dr. Odom, HTN, presents to the ED complaining of right 1st metatarsal joint wound that started around 4-5 days ago.  Patient denies fever, chills.  In the ED he is found to have a purulent appearing large wound of the right medial 1st metatarsal joint.  WBC 26548.  3% bands.  Afebrile.  HR 88-97.  Lactic acid pending.  .  X-ray of the right foot reveals arthritic type changes with possibilities of erosion involving the 1st metatarsal joint, with associated large amount of soft tissue swelling/cellulitis.  Patient started on IV vancomycin, IV Zosyn.  MRI right foot pending to rule out osteomyelitis.    Hospital Course:  42 y/o wm admitted with a dx of right toe cellutis and  An abscess . He was started on IVAB . The MRI show  Cellulitis and  Small fluid collection . Podiatrist  was consulted and plan for I&D  Tomorrow .  3/24 Pt was seen and examined at bedside . There was no acute event overnight .  He is schedule for I&D today . The blood cx are positive for GPC   3/25 Pt is s/p I&D today . The blood cx is (+) for  Staph . There was no acute event overnight     Interval History:     Review of Systems   Constitutional: Negative.  Negative for appetite change, fatigue and fever.   HENT: Negative.  Negative for congestion, nosebleeds and sore throat.    Eyes: Negative.  Negative for visual disturbance.   Respiratory: Negative.  Negative for cough, shortness of breath and wheezing.    Cardiovascular: Negative.  Negative for  chest pain, palpitations and leg swelling.   Gastrointestinal: Negative.  Negative for abdominal pain, constipation, diarrhea, nausea and vomiting.   Endocrine: Negative.  Negative for polyuria.   Genitourinary: Negative.  Negative for dysuria, flank pain, frequency and urgency.   Musculoskeletal: Negative.  Negative for arthralgias, back pain and joint swelling.   Skin: Positive for color change and wound (right first/great toe). Negative for pallor and rash.   Allergic/Immunologic: Negative.  Negative for immunocompromised state.   Neurological: Negative.  Negative for dizziness, syncope, weakness, light-headedness, numbness and headaches.   Hematological: Negative.    Psychiatric/Behavioral: Negative.  Negative for confusion and hallucinations. The patient is not nervous/anxious.    All other systems reviewed and are negative.    Objective:     Vital Signs (Most Recent):  Temp: 97.4 °F (36.3 °C) (03/25/19 1603)  Pulse: (!) 58 (03/25/19 1603)  Resp: 18 (03/25/19 1603)  BP: 137/88 (03/25/19 1603)  SpO2: 98 % (03/25/19 1603) Vital Signs (24h Range):  Temp:  [97.3 °F (36.3 °C)-98.5 °F (36.9 °C)] 97.4 °F (36.3 °C)  Pulse:  [58-70] 58  Resp:  [10-19] 18  SpO2:  [95 %-100 %] 98 %  BP: (105-137)/(72-88) 137/88     Weight: 97.3 kg (214 lb 8.1 oz)  Body mass index is 33.6 kg/m².    Intake/Output Summary (Last 24 hours) at 3/25/2019 1818  Last data filed at 3/25/2019 1600  Gross per 24 hour   Intake 1110 ml   Output 2005 ml   Net -895 ml      Physical Exam   Constitutional: He is oriented to person, place, and time. He appears well-developed and well-nourished. No distress.   HENT:   Head: Normocephalic and atraumatic.   Eyes: Conjunctivae and EOM are normal. No scleral icterus.   Neck: Normal range of motion. Neck supple. No thyromegaly present.   Cardiovascular: Normal rate, regular rhythm and normal heart sounds.   No murmur heard.  Pulmonary/Chest: Effort normal and breath sounds normal. No respiratory distress. He has  no wheezes. He exhibits no tenderness.   Abdominal: Soft. Bowel sounds are normal. There is no tenderness.   Musculoskeletal: Normal range of motion. He exhibits deformity. He exhibits no edema or tenderness.   Lymphadenopathy:     He has no cervical adenopathy.   Neurological: He is alert and oriented to person, place, and time. No cranial nerve deficit. He exhibits normal muscle tone. Coordination normal.   Skin: Skin is warm and dry. He is not diaphoretic. No erythema.   Covered    Psychiatric: He has a normal mood and affect. His behavior is normal. Thought content normal.   Nursing note and vitals reviewed.      Significant Labs: All pertinent labs within the past 24 hours have been reviewed.    Significant Imaging: I have reviewed all pertinent imaging results/findings within the past 24 hours.    Assessment/Plan:      * Cellulitis and abscess of toe, right  Large open wound/ulcer right medial great toe with purulence noted.  Cont  IV vancomycin and  IV Zosyn   Follow up on blood and wound cultures.  Wound Care consulted.  MRI right foot show  cellulitis , small fluid collection ( abscess ) and possible osteo   Podiatrist consulted . S/P   I&D  Today   ID consulted       Osteomyelitis of right foot  Cont IVAB       Chronic gouty arthritis  Acute gout flare up superimpose with an infection   Patient reports taking allopurinol on a daily basis.  Add HSAI PRN   Followed by rheumatologist, Dr. Odom in the [facility].      Bacteremia due to Gram-positive bacteria  2/2 to above   Staph   Cont current  tx   F/U final result   Cont current IVAB       Hypertension  Continue home dose lisinopril and amlodipine.  Avoid HCTZ.  Monitor blood pressure closely and make adjustments as needed.        VTE Risk Mitigation (From admission, onward)        Ordered     Place MOLLY hose  Until discontinued      03/22/19 4159              Faustino Schaefer MD  Department of Hospital Medicine   [facility]

## 2019-03-25 NOTE — ANESTHESIA POSTPROCEDURE EVALUATION
Anesthesia Post Evaluation    Patient: Jonh Najera    Procedure(s) Performed: Procedure(s) (LRB):  INCISION AND DRAINAGE, FOOT (Right)  BIOPSY, BONE (Right)    Final Anesthesia Type: MAC  Patient location during evaluation: PACU  Patient participation: Yes- Able to Participate  Level of consciousness: awake and alert and oriented  Post-procedure vital signs: reviewed and stable  Pain management: adequate  Airway patency: patent  PONV status at discharge: No PONV  Anesthetic complications: no      Cardiovascular status: hemodynamically stable  Respiratory status: unassisted, spontaneous ventilation and room air  Hydration status: euvolemic  Follow-up not needed.          Vitals Value Taken Time   /72 3/25/2019  8:18 AM   Temp 36.3 °C (97.3 °F) 3/25/2019  8:18 AM   Pulse 66 3/25/2019  8:18 AM   Resp 19 3/25/2019  8:18 AM   SpO2 99 % 3/25/2019  8:18 AM         Event Time     Out of Recovery 03/25/2019 08:38:58          Pain/Aaron Score: Pain Rating Prior to Med Admin: 9 (3/25/2019 12:14 PM)  Pain Rating Post Med Admin: 5 (3/25/2019 11:05 AM)  Aaron Score: 9 (3/25/2019  8:30 AM)

## 2019-03-26 LAB
ANION GAP SERPL CALC-SCNC: 11 MMOL/L (ref 8–16)
BACTERIA BLD CULT: NORMAL
BASOPHILS # BLD AUTO: 0.07 K/UL (ref 0–0.2)
BASOPHILS NFR BLD: 0.7 % (ref 0–1.9)
BUN SERPL-MCNC: 12 MG/DL (ref 6–20)
CALCIUM SERPL-MCNC: 9.4 MG/DL (ref 8.7–10.5)
CHLORIDE SERPL-SCNC: 100 MMOL/L (ref 95–110)
CO2 SERPL-SCNC: 26 MMOL/L (ref 23–29)
CREAT SERPL-MCNC: 1.3 MG/DL (ref 0.5–1.4)
CRP SERPL-MCNC: 22.3 MG/L (ref 0–8.2)
DIFFERENTIAL METHOD: ABNORMAL
EOSINOPHIL # BLD AUTO: 0.7 K/UL (ref 0–0.5)
EOSINOPHIL NFR BLD: 6.7 % (ref 0–8)
ERYTHROCYTE [DISTWIDTH] IN BLOOD BY AUTOMATED COUNT: 13.7 % (ref 11.5–14.5)
ERYTHROCYTE [SEDIMENTATION RATE] IN BLOOD BY WESTERGREN METHOD: 39 MM/HR (ref 0–10)
EST. GFR  (AFRICAN AMERICAN): >60 ML/MIN/1.73 M^2
EST. GFR  (NON AFRICAN AMERICAN): >60 ML/MIN/1.73 M^2
GLUCOSE SERPL-MCNC: 115 MG/DL (ref 70–110)
HCT VFR BLD AUTO: 44.6 % (ref 40–54)
HGB BLD-MCNC: 14.7 G/DL (ref 14–18)
LYMPHOCYTES # BLD AUTO: 2.5 K/UL (ref 1–4.8)
LYMPHOCYTES NFR BLD: 25.6 % (ref 18–48)
MCH RBC QN AUTO: 27.4 PG (ref 27–31)
MCHC RBC AUTO-ENTMCNC: 33 G/DL (ref 32–36)
MCV RBC AUTO: 83 FL (ref 82–98)
MONOCYTES # BLD AUTO: 1.1 K/UL (ref 0.3–1)
MONOCYTES NFR BLD: 11.5 % (ref 4–15)
NEUTROPHILS # BLD AUTO: 5.3 K/UL (ref 1.8–7.7)
NEUTROPHILS NFR BLD: 55.5 % (ref 38–73)
PLATELET # BLD AUTO: 394 K/UL (ref 150–350)
PMV BLD AUTO: 9.2 FL (ref 9.2–12.9)
POTASSIUM SERPL-SCNC: 4.3 MMOL/L (ref 3.5–5.1)
RBC # BLD AUTO: 5.36 M/UL (ref 4.6–6.2)
SODIUM SERPL-SCNC: 137 MMOL/L (ref 136–145)
VANCOMYCIN TROUGH SERPL-MCNC: 19.7 UG/ML (ref 10–22)
WBC # BLD AUTO: 9.64 K/UL (ref 3.9–12.7)

## 2019-03-26 PROCEDURE — 80202 ASSAY OF VANCOMYCIN: CPT

## 2019-03-26 PROCEDURE — 87040 BLOOD CULTURE FOR BACTERIA: CPT

## 2019-03-26 PROCEDURE — 85651 RBC SED RATE NONAUTOMATED: CPT

## 2019-03-26 PROCEDURE — 25000003 PHARM REV CODE 250: Performed by: PODIATRIST

## 2019-03-26 PROCEDURE — 63600175 PHARM REV CODE 636 W HCPCS: Performed by: PODIATRIST

## 2019-03-26 PROCEDURE — 36415 COLL VENOUS BLD VENIPUNCTURE: CPT

## 2019-03-26 PROCEDURE — 11000001 HC ACUTE MED/SURG PRIVATE ROOM

## 2019-03-26 PROCEDURE — 80048 BASIC METABOLIC PNL TOTAL CA: CPT

## 2019-03-26 PROCEDURE — 63600175 PHARM REV CODE 636 W HCPCS: Performed by: INTERNAL MEDICINE

## 2019-03-26 PROCEDURE — 85025 COMPLETE CBC W/AUTO DIFF WBC: CPT

## 2019-03-26 PROCEDURE — 86140 C-REACTIVE PROTEIN: CPT

## 2019-03-26 PROCEDURE — S4991 NICOTINE PATCH NONLEGEND: HCPCS | Performed by: PODIATRIST

## 2019-03-26 RX ORDER — CEFAZOLIN SODIUM 2 G/50ML
2 SOLUTION INTRAVENOUS
Status: DISCONTINUED | OUTPATIENT
Start: 2019-03-26 | End: 2019-03-27 | Stop reason: HOSPADM

## 2019-03-26 RX ADMIN — NICOTINE 1 PATCH: 21 PATCH, EXTENDED RELEASE TRANSDERMAL at 08:03

## 2019-03-26 RX ADMIN — VANCOMYCIN HYDROCHLORIDE 1500 MG: 100 INJECTION, POWDER, LYOPHILIZED, FOR SOLUTION INTRAVENOUS at 02:03

## 2019-03-26 RX ADMIN — FAMOTIDINE 20 MG: 20 TABLET, FILM COATED ORAL at 08:03

## 2019-03-26 RX ADMIN — AMLODIPINE BESYLATE 10 MG: 10 TABLET ORAL at 08:03

## 2019-03-26 RX ADMIN — METOPROLOL TARTRATE 50 MG: 50 TABLET ORAL at 08:03

## 2019-03-26 RX ADMIN — LISINOPRIL 10 MG: 10 TABLET ORAL at 08:03

## 2019-03-26 RX ADMIN — DIPHENHYDRAMINE HYDROCHLORIDE 25 MG: 25 CAPSULE ORAL at 02:03

## 2019-03-26 RX ADMIN — OXYCODONE AND ACETAMINOPHEN 1 TABLET: 10; 325 TABLET ORAL at 08:03

## 2019-03-26 RX ADMIN — OXYCODONE AND ACETAMINOPHEN 1 TABLET: 10; 325 TABLET ORAL at 02:03

## 2019-03-26 RX ADMIN — CEFAZOLIN SODIUM 2 G: 2 SOLUTION INTRAVENOUS at 08:03

## 2019-03-26 RX ADMIN — KETOROLAC TROMETHAMINE 15 MG: 30 INJECTION, SOLUTION INTRAMUSCULAR; INTRAVENOUS at 12:03

## 2019-03-26 RX ADMIN — OXYCODONE AND ACETAMINOPHEN 1 TABLET: 10; 325 TABLET ORAL at 06:03

## 2019-03-26 RX ADMIN — PIPERACILLIN SODIUM AND TAZOBACTAM SODIUM 4.5 G: 4; .5 INJECTION, POWDER, LYOPHILIZED, FOR SOLUTION INTRAVENOUS at 04:03

## 2019-03-26 RX ADMIN — CEFAZOLIN SODIUM 2 G: 2 SOLUTION INTRAVENOUS at 11:03

## 2019-03-26 NOTE — ASSESSMENT & PLAN NOTE
Large open wound/ulcer right medial great toe with purulence noted.  S/P IV vancomycin and  IV Zosyn   Started on cefazolin   blood CX Staph MSSA  wound cultures  NGTD  Wound Care consulted.  MRI right foot show  cellulitis , small fluid collection ( abscess ) and possible osteo   Podiatrist consulted . S/P   I&D   ID consulted

## 2019-03-26 NOTE — SUBJECTIVE & OBJECTIVE
Interval History:     Review of Systems   Constitutional: Negative.  Negative for appetite change, fatigue and fever.   HENT: Negative.  Negative for congestion, nosebleeds and sore throat.    Eyes: Negative.  Negative for visual disturbance.   Respiratory: Negative.  Negative for cough, shortness of breath and wheezing.    Cardiovascular: Negative.  Negative for chest pain, palpitations and leg swelling.   Gastrointestinal: Negative.  Negative for abdominal pain, constipation, diarrhea, nausea and vomiting.   Endocrine: Negative.  Negative for polyuria.   Genitourinary: Negative.  Negative for dysuria, flank pain, frequency and urgency.   Musculoskeletal: Negative.  Negative for arthralgias, back pain and joint swelling.   Skin: Positive for wound (right first/great toe). Negative for pallor and rash.   Allergic/Immunologic: Negative.  Negative for immunocompromised state.   Neurological: Negative.  Negative for dizziness, syncope, weakness, light-headedness, numbness and headaches.   Hematological: Negative.    Psychiatric/Behavioral: Negative.  Negative for confusion and hallucinations. The patient is not nervous/anxious.    All other systems reviewed and are negative.    Objective:     Vital Signs (Most Recent):  Temp: 97.8 °F (36.6 °C) (03/26/19 0714)  Pulse: 62 (03/26/19 0818)  Resp: 14 (03/26/19 0714)  BP: 137/83 (03/26/19 0714)  SpO2: 96 % (03/26/19 0714) Vital Signs (24h Range):  Temp:  [97.4 °F (36.3 °C)-98.2 °F (36.8 °C)] 97.8 °F (36.6 °C)  Pulse:  [54-63] 62  Resp:  [14-18] 14  SpO2:  [96 %-98 %] 96 %  BP: (129-151)/(83-96) 137/83     Weight: 97.3 kg (214 lb 8.1 oz)  Body mass index is 33.6 kg/m².    Intake/Output Summary (Last 24 hours) at 3/26/2019 1036  Last data filed at 3/26/2019 1010  Gross per 24 hour   Intake 2240 ml   Output 3150 ml   Net -910 ml      Physical Exam   Constitutional: He is oriented to person, place, and time. He appears well-developed and well-nourished. No distress.   HENT:    Head: Normocephalic and atraumatic.   Eyes: Conjunctivae and EOM are normal. No scleral icterus.   Neck: Normal range of motion. Neck supple. No thyromegaly present.   Cardiovascular: Normal rate, regular rhythm and normal heart sounds.   No murmur heard.  Pulmonary/Chest: Effort normal and breath sounds normal. No respiratory distress. He has no wheezes. He exhibits no tenderness.   Abdominal: Soft. Bowel sounds are normal. There is no tenderness.   Musculoskeletal: Normal range of motion. He exhibits deformity. He exhibits no edema or tenderness.   Lymphadenopathy:     He has no cervical adenopathy.   Neurological: He is alert and oriented to person, place, and time. No cranial nerve deficit. He exhibits normal muscle tone. Coordination normal.   Skin: Skin is warm and dry. He is not diaphoretic. No erythema.   Covered    Psychiatric: He has a normal mood and affect. His behavior is normal. Thought content normal.   Nursing note and vitals reviewed.      Significant Labs: All pertinent labs within the past 24 hours have been reviewed.    Significant Imaging: I have reviewed all pertinent imaging results/findings within the past 24 hours.

## 2019-03-26 NOTE — PROGRESS NOTES
[facility] Hospital Medicine  Progress Note    Patient Name: Jonh Najera  MRN: [id number]  Patient Class: IP- Inpatient   Admission Date: 3/22/2019  Length of Stay: 4 days  Attending Physician: Faustino Schaefer, *  Primary Care Provider: Provider Notinsystem        Subjective:     Principal Problem:Cellulitis and abscess of toe, right    HPI:  Mr. Najera is a 43-year-old  male with PMH significant for gouty arthritis, followed by rheumatologist, Dr. Odom, HTN, presents to the ED complaining of right 1st metatarsal joint wound that started around 4-5 days ago.  Patient denies fever, chills.  In the ED he is found to have a purulent appearing large wound of the right medial 1st metatarsal joint.  WBC 10419.  3% bands.  Afebrile.  HR 88-97.  Lactic acid pending.  .  X-ray of the right foot reveals arthritic type changes with possibilities of erosion involving the 1st metatarsal joint, with associated large amount of soft tissue swelling/cellulitis.  Patient started on IV vancomycin, IV Zosyn.  MRI right foot pending to rule out osteomyelitis.    Hospital Course:  44 y/o wm admitted with a dx of right toe cellutis and  An abscess . He was started on IVAB . The MRI show  Cellulitis and  Small fluid collection . Podiatrist  was consulted and plan for I&D  Tomorrow .  3/24 Pt was seen and examined at bedside . There was no acute event overnight .  He is schedule for I&D today . The blood cx are positive for GPC   3/25 Pt is s/p I&D today . The blood cx is (+) for  Staph . There was no acute event overnight   3/26 Pt was seen and examined at bedside . The blood cx  Is Staph  MSSA . The IVAB was changed to cefazolin . There was no acute event overnight     Interval History:     Review of Systems   Constitutional: Negative.  Negative for appetite change, fatigue and fever.   HENT: Negative.  Negative for congestion, nosebleeds and sore throat.    Eyes: Negative.  Negative for  visual disturbance.   Respiratory: Negative.  Negative for cough, shortness of breath and wheezing.    Cardiovascular: Negative.  Negative for chest pain, palpitations and leg swelling.   Gastrointestinal: Negative.  Negative for abdominal pain, constipation, diarrhea, nausea and vomiting.   Endocrine: Negative.  Negative for polyuria.   Genitourinary: Negative.  Negative for dysuria, flank pain, frequency and urgency.   Musculoskeletal: Negative.  Negative for arthralgias, back pain and joint swelling.   Skin: Positive for wound (right first/great toe). Negative for pallor and rash.   Allergic/Immunologic: Negative.  Negative for immunocompromised state.   Neurological: Negative.  Negative for dizziness, syncope, weakness, light-headedness, numbness and headaches.   Hematological: Negative.    Psychiatric/Behavioral: Negative.  Negative for confusion and hallucinations. The patient is not nervous/anxious.    All other systems reviewed and are negative.    Objective:     Vital Signs (Most Recent):  Temp: 97.8 °F (36.6 °C) (03/26/19 0714)  Pulse: 62 (03/26/19 0818)  Resp: 14 (03/26/19 0714)  BP: 137/83 (03/26/19 0714)  SpO2: 96 % (03/26/19 0714) Vital Signs (24h Range):  Temp:  [97.4 °F (36.3 °C)-98.2 °F (36.8 °C)] 97.8 °F (36.6 °C)  Pulse:  [54-63] 62  Resp:  [14-18] 14  SpO2:  [96 %-98 %] 96 %  BP: (129-151)/(83-96) 137/83     Weight: 97.3 kg (214 lb 8.1 oz)  Body mass index is 33.6 kg/m².    Intake/Output Summary (Last 24 hours) at 3/26/2019 1036  Last data filed at 3/26/2019 1010  Gross per 24 hour   Intake 2240 ml   Output 3150 ml   Net -910 ml      Physical Exam   Constitutional: He is oriented to person, place, and time. He appears well-developed and well-nourished. No distress.   HENT:   Head: Normocephalic and atraumatic.   Eyes: Conjunctivae and EOM are normal. No scleral icterus.   Neck: Normal range of motion. Neck supple. No thyromegaly present.   Cardiovascular: Normal rate, regular rhythm and normal  heart sounds.   No murmur heard.  Pulmonary/Chest: Effort normal and breath sounds normal. No respiratory distress. He has no wheezes. He exhibits no tenderness.   Abdominal: Soft. Bowel sounds are normal. There is no tenderness.   Musculoskeletal: Normal range of motion. He exhibits deformity. He exhibits no edema or tenderness.   Lymphadenopathy:     He has no cervical adenopathy.   Neurological: He is alert and oriented to person, place, and time. No cranial nerve deficit. He exhibits normal muscle tone. Coordination normal.   Skin: Skin is warm and dry. He is not diaphoretic. No erythema.   Covered    Psychiatric: He has a normal mood and affect. His behavior is normal. Thought content normal.   Nursing note and vitals reviewed.      Significant Labs: All pertinent labs within the past 24 hours have been reviewed.    Significant Imaging: I have reviewed all pertinent imaging results/findings within the past 24 hours.    Assessment/Plan:      * Cellulitis and abscess of toe, right  Large open wound/ulcer right medial great toe with purulence noted.  S/P IV vancomycin and  IV Zosyn   Started on cefazolin   blood CX Staph MSSA  wound cultures  NGTD  Wound Care consulted.  MRI right foot show  cellulitis , small fluid collection ( abscess ) and possible osteo   Podiatrist consulted . S/P   I&D   ID consulted       Osteomyelitis of right foot  Cont IVAB   Repeat sed rate and CRP  ID consulted       Chronic gouty arthritis  Acute gout flare up superimpose with an infection   Patient reports taking allopurinol on a daily basis.  Add HSAI PRN   Followed by rheumatologist, Dr. Odom in the [facility].      Bacteremia due to Gram-positive bacteria  2/2 to above   Staph  MSSA  Cont current  tx   final result :   STAPHYLOCOCCUS AUREUS   ID consult required at [facility] and [facility]   locations.    Susceptibility      Staphylococcus aureus     CULTURE, BLOOD     Clindamycin <=0.5  Sensitive      Erythromycin >4  Resistant     Oxacillin <=0.25  Sensitive     Penicillin 2  Resistant     Tetracycline <=4  Sensitive     Trimeth/Sulfa <=0.5/9.5  Sensitive              S/p vanc and zosyn . Changed to cefazolin       Hypertension  Continue home dose lisinopril and amlodipine.  Avoid HCTZ.  Monitor blood pressure closely and make adjustments as needed.        VTE Risk Mitigation (From admission, onward)        Ordered     Place MOLLY hose  Until discontinued      03/22/19 7211              Faustino Schaefer MD  Department of Hospital Medicine   [facility]

## 2019-03-26 NOTE — PROGRESS NOTES
Subjective:     Patient ID: Jonh Najera is a 43 y.o. male.    Chief Complaint: Abscess (abscess/sore to R great toe from gout for about 4 days)      HPI: This 43 year old male seen at bedside 1 days status post right foot I&D with bone biopsy procedure. Patient has no complaints of fever chills or sweats. Negative pain. Patient states dressing was kept dry, clean, and intact.     Patient Active Problem List   Diagnosis    Gout flare    Arthritis    Hypertension    Chronic pancreatitis    Cellulitis and abscess of elbow    Cellulitis of foot, right    Streptococcal bacteremia    Drug abuse    Bacteremia due to Gram-positive bacteria    Cellulitis of elbow    Cellulitis and abscess of toe, right    Chronic gouty arthritis    Osteomyelitis of right foot       Current Discharge Medication List      CONTINUE these medications which have NOT CHANGED    Details   metoprolol tartrate (LOPRESSOR) 50 MG tablet Take 50 mg by mouth 2 (two) times daily.      colchicine 0.6 mg tablet 1 tablet PO at the first sign of a gout flare then 1 tablet PO 1 hour later.  Do not exceed 1.2 mg in a 24 hour period  Qty: 18 tablet, Refills: 0      indomethacin (INDOCIN) 25 MG capsule Take 1 capsule (25 mg total) by mouth 3 (three) times daily as needed (pain). Take with food  Qty: 12 capsule, Refills: 0      lisinopril 10 MG tablet Take 10 mg by mouth once daily.             Review of patient's allergies indicates:   Allergen Reactions    Compazine [prochlorperazine edisylate] Other (See Comments)     Muscle spasms    Inapsine [droperidol] Other (See Comments)     Muscle spasms       Past Surgical History:   Procedure Laterality Date    arm surgery      BIOPSY, BONE Right 3/25/2019    Performed by Piter Marie DPM at [facility]    CHOLECYSTECTOMY      FACIAL RECONSTRUCTION SURGERY      FOOT SURGERY      INCISION AND DRAINAGE, FOOT Right 3/25/2019    Performed by Piter Marie DPM at [facility]       Family History    Problem Relation Age of Onset    Diabetes Mother     Hypertension Mother     Heart disease Mother     Diabetes Father     Hypertension Father     Heart disease Father     Diabetes Paternal Grandmother     Hypertension Paternal Grandmother        Social History     Socioeconomic History    Marital status:      Spouse name: Not on file    Number of children: Not on file    Years of education: Not on file    Highest education level: Not on file   Occupational History    Not on file   Social Needs    Financial resource strain: Not on file    Food insecurity:     Worry: Not on file     Inability: Not on file    Transportation needs:     Medical: Not on file     Non-medical: Not on file   Tobacco Use    Smoking status: Current Every Day Smoker     Packs/day: 0.50     Types: Cigarettes    Smokeless tobacco: Never Used   Substance and Sexual Activity    Alcohol use: No    Drug use: Yes     Types: Amphetamines, Methamphetamines, IV     Comment: Pt is using IV heroine, methamphetamines, cocaine, and opana    Sexual activity: Yes     Partners: Female   Lifestyle    Physical activity:     Days per week: Not on file     Minutes per session: Not on file    Stress: Not on file   Relationships    Social connections:     Talks on phone: Not on file     Gets together: Not on file     Attends [employer]ism service: Not on file     Active member of club or organization: Not on file     Attends meetings of clubs or organizations: Not on file     Relationship status: Not on file    Intimate partner violence:     Fear of current or ex partner: Not on file     Emotionally abused: Not on file     Physically abused: Not on file     Forced sexual activity: Not on file   Other Topics Concern    Not on file   Social History Narrative    Not on file       Vitals:    03/26/19 1118 03/26/19 1231 03/26/19 1557 03/26/19 1633   BP: 118/78 120/79  120/73   Pulse: 60 (!) 57  (!) 59   Resp: 14 18  18   Temp: 97.7 °F  (36.5 °C) 97.6 °F (36.4 °C)  97.4 °F (36.3 °C)   TempSrc: Oral Oral  Oral   SpO2: 98% 98% 98% 98%   Weight:       Height:           Review of Systems   Constitutional: Negative for chills and fever.   Respiratory: Negative for shortness of breath.    Cardiovascular: Negative for chest pain, palpitations, orthopnea, claudication and leg swelling.   Gastrointestinal: Negative for diarrhea, nausea and vomiting.   Musculoskeletal: Negative for joint pain.   Skin: Negative for rash.   Neurological: Negative for dizziness, tingling, sensory change, focal weakness and weakness.   Psychiatric/Behavioral: Negative.          Objective:      Physical examination: General: Patient is in no acute distress, alert and oriented x 3.  Dressing to right foot clean, dry, and intact.   Lower Extremity Exam:  Vascular: Dorsalis pedis and Posterior tibial pulses palpable on right foot.  Capillary fill time <3 sec to toes on right foot. Decreased edema noted on right foot.   Dermatologic: Sutures Intact. Incision site well copated on right foot. Open wound noted to be clean with granular base. Negative erythema, drainage, or increased temp noted to surgical site.   Neurological: Light touch sensation intact to right foot.   Musculoskeletal: Positive decreased pain on palpation/ROM of right foot.                         Assessment:       Osteomyelitis of right foot, unspecified type  -     Case Request Operating Room: INCISION AND DRAINAGE, FOOT, EXCISIONAL BIOPSY; Standing    Cellulitis and abscess of foot  -     X-Ray Foot Complete Right; Standing  -     Case Request Operating Room: INCISION AND DRAINAGE, FOOT, EXCISIONAL BIOPSY; Standing    Chronic gouty arthritis  -     Case Request Operating Room: INCISION AND DRAINAGE, FOOT, EXCISIONAL BIOPSY; Standing    Infective endocarditis  -     2D echo with color flow doppler; Standing    Other orders  -     HIV 1/2 Ag/Ab (4th Gen); Standing  -     CBC auto differential; Standing  -      Comprehensive metabolic panel; Standing  -     Blood culture #1 **CANNOT BE ORDERED STAT**; Standing  -     Blood culture #2 **CANNOT BE ORDERED STAT**; Standing  -     Insert Saline lock IV; Standing  -     sodium chloride 0.9% bolus 500 mL  -     C-reactive protein; Standing  -     vancomycin in dextrose 5 % 1 gram/250 mL IVPB 1,000 mg  -     piperacillin-tazobactam 4.5 g in dextrose 5 % 100 mL IVPB (ready to mix system)  -     Cancel: Pharmacy to dose Vancomycin consult; Standing  -     Discontinue: vancomycin in dextrose 5 % 1 gram/250 mL IVPB 1,000 mg  -     Cancel: MRI Foot Toes Without Contrast Right; Standing  -     Cancel: MRI Foot Toes Without Contrast Right; Standing  -     MRI Foot Toes W WO Contrast Right; Standing  -     Full code; Standing  -     Vital signs; Standing  -     Log roll; Standing  -     Intake and output; Standing  -     Notify physician ; Standing  -     sodium chloride 0.9% flush 5 mL  -     Discontinue: ondansetron injection 4 mg  -     promethazine (PHENERGAN) 6.25 mg in dextrose 5 % 50 mL IVPB  -     Discontinue: acetaminophen tablet 650 mg  -     Pulse Oximetry Q4H; Standing  -     Admit to Inpatient; Standing  -     Progressive Mobility Protocol (mobilize patient to their highest level of functioning at least twice daily); Standing  -     Cancel: Diet Adult Regular (IDDSI Level 7); Standing  -     Place MOLLY hose; Standing  -     Discontinue: piperacillin-tazobactam 4.5 g in dextrose 5 % 100 mL IVPB (ready to mix system)  -     Discontinue: vancomycin (VANCOCIN) 15 mg/kg in dextrose 5 % 500 mL IVPB  -     Cancel: CBC auto differential; Standing  -     Cancel: Comprehensive metabolic panel; Standing  -     CBC auto differential; Standing  -     Comprehensive metabolic panel; Standing  -     0.9%  NaCl infusion  -     ondansetron injection 4 mg  -     acetaminophen tablet 650 mg  -     oxyCODONE-acetaminophen 5-325 mg per tablet 1 tablet  -     oxyCODONE-acetaminophen  mg per  tablet 1 tablet  -     famotidine tablet 20 mg  -     guaifenesin 100 mg/5 ml syrup 200 mg  -     aluminum-magnesium hydroxide-simethicone 200-200-20 mg/5 mL suspension 30 mL  -     albuterol-ipratropium 2.5 mg-0.5 mg/3 mL nebulizer solution 3 mL  -     Inhalation Treatment Q4H PRN; Standing  -     diphenhydrAMINE capsule 25 mg  -     hydrALAZINE injection 10 mg  -     Inpatient consult to Wound Care; Standing  -     nicotine 21 mg/24 hr 1 patch  -     lisinopril tablet 10 mg  -     metoprolol tartrate (LOPRESSOR) tablet 50 mg  -     amLODIPine tablet 10 mg  -     Cancel: Lactic acid, plasma; Standing  -     Culture, Anaerobe; Standing  -     Aerobic culture; Standing  -     gadobutrol 10 mL  -     Cancel: Lactic acid, plasma; Standing  -     Lactic acid, plasma; Standing  -     Lactic acid, plasma; Standing  -     Inpatient consult to Wound Care; Standing  -     Discontinue: vancomycin 1.5 g in 5 % dextrose 250 mL IVPB  -     Cancel: VANCOMYCIN, TROUGH before 4th dose; Standing  -     Discontinue: vancomycin 1.5 g in 5 % dextrose 250 mL IVPB  -     VANCOMYCIN, TROUGH before next dose; Standing  -     Inpatient consult to Podiatry; Standing  -     Inpatient consult to Infectious Diseases; Standing  -     Cancel: Diet NPO; Standing  -     Discontinue: ketorolac injection 15 mg  -     VANCOMYCIN, TROUGH before next dose; Standing  -     Notify Physician - Potential Need of Opioid Reversal; Standing  -     maintenance fluids per service; Standing  -     sodium chloride 0.9% flush 3 mL  -     VANCOMYCIN, TROUGH before 3rd dose; Standing  -     Cancel: Diet Adult Regular (IDDSI Level 7) [facility]; Standing  -     Cancel: Diet NPO; Standing  -     Discontinue: nozaseptin (NOZIN) nasal   -     Cancel: Admit to Phase 1 PACU, transfer to Phase 2 per protocol when indicated ; Standing  -     Cancel: Vital signs; Standing  -     Cancel: Intake and output Per protocol; Standing  -     Cancel: Apply warming  blanket; Standing  -     Cancel: Notify Anesthesiologist; Standing  -     Cancel: Notify Physician - Potential Need of Opioid Reversal; Standing  -     Cancel: maintenance fluids per service; Standing  -     Discontinue: sodium chloride 0.9% flush 3 mL  -     Discontinue: sodium chloride 0.9% flush 3 mL  -     acetaminophen (10 mg/mL) injection 1,000 mg  -     Discontinue: hydromorphone (PF) injection 0.2 mg  -     Discontinue: meperidine injection 12.5 mg  -     Cancel: Oxygen Continuous; Standing  -     Cancel: Pulse Oximetry Q4H; Standing  -     Culture, Anaerobe; Standing  -     Aerobic culture; Standing  -     Discontinue: bupivacaine (PF) 0.5% (5 mg/mL) injection  -     Discontinue: lidocaine (PF) 10 mg/ml (1%) injection  -     Specimen to Pathology - Surgery; Standing  -     Specimen to Pathology - Surgery; Standing  -     Transfer patient; Standing  -     Diet Adult Regular (IDDSI Level 7) [facility]; Standing  -     Blood culture; Standing  -     CBC auto differential; Standing  -     Basic metabolic panel; Standing  -     ketorolac injection 15 mg  -     Cancel: VANCOMYCIN, TROUGH before 3rd dose; Standing  -     cefazolin (ANCEF) 2 gram in dextrose 5% 50 mL IVPB (premix)  -     Sedimentation rate; Standing  -     C-reactive protein; Standing          Plan:   Osteomyelitis of right foot, unspecified type  -     Case Request Operating Room: INCISION AND DRAINAGE, FOOT, EXCISIONAL BIOPSY; Standing    Cellulitis and abscess of foot  -     X-Ray Foot Complete Right; Standing  -     Case Request Operating Room: INCISION AND DRAINAGE, FOOT, EXCISIONAL BIOPSY; Standing    Chronic gouty arthritis  -     Case Request Operating Room: INCISION AND DRAINAGE, FOOT, EXCISIONAL BIOPSY; Standing    Infective endocarditis  -     2D echo with color flow doppler; Standing    Other orders  -     HIV 1/2 Ag/Ab (4th Gen); Standing  -     CBC auto differential; Standing  -     Comprehensive metabolic panel; Standing  -      Blood culture #1 **CANNOT BE ORDERED STAT**; Standing  -     Blood culture #2 **CANNOT BE ORDERED STAT**; Standing  -     Insert Saline lock IV; Standing  -     sodium chloride 0.9% bolus 500 mL  -     C-reactive protein; Standing  -     vancomycin in dextrose 5 % 1 gram/250 mL IVPB 1,000 mg  -     piperacillin-tazobactam 4.5 g in dextrose 5 % 100 mL IVPB (ready to mix system)  -     Cancel: Pharmacy to dose Vancomycin consult; Standing  -     Discontinue: vancomycin in dextrose 5 % 1 gram/250 mL IVPB 1,000 mg  -     Cancel: MRI Foot Toes Without Contrast Right; Standing  -     Cancel: MRI Foot Toes Without Contrast Right; Standing  -     MRI Foot Toes W WO Contrast Right; Standing  -     Full code; Standing  -     Vital signs; Standing  -     Log roll; Standing  -     Intake and output; Standing  -     Notify physician ; Standing  -     sodium chloride 0.9% flush 5 mL  -     Discontinue: ondansetron injection 4 mg  -     promethazine (PHENERGAN) 6.25 mg in dextrose 5 % 50 mL IVPB  -     Discontinue: acetaminophen tablet 650 mg  -     Pulse Oximetry Q4H; Standing  -     Admit to Inpatient; Standing  -     Progressive Mobility Protocol (mobilize patient to their highest level of functioning at least twice daily); Standing  -     Cancel: Diet Adult Regular (IDDSI Level 7); Standing  -     Place MOLLY hose; Standing  -     Discontinue: piperacillin-tazobactam 4.5 g in dextrose 5 % 100 mL IVPB (ready to mix system)  -     Discontinue: vancomycin (VANCOCIN) 15 mg/kg in dextrose 5 % 500 mL IVPB  -     Cancel: CBC auto differential; Standing  -     Cancel: Comprehensive metabolic panel; Standing  -     CBC auto differential; Standing  -     Comprehensive metabolic panel; Standing  -     0.9%  NaCl infusion  -     ondansetron injection 4 mg  -     acetaminophen tablet 650 mg  -     oxyCODONE-acetaminophen 5-325 mg per tablet 1 tablet  -     oxyCODONE-acetaminophen  mg per tablet 1 tablet  -     famotidine tablet 20  mg  -     guaifenesin 100 mg/5 ml syrup 200 mg  -     aluminum-magnesium hydroxide-simethicone 200-200-20 mg/5 mL suspension 30 mL  -     albuterol-ipratropium 2.5 mg-0.5 mg/3 mL nebulizer solution 3 mL  -     Inhalation Treatment Q4H PRN; Standing  -     diphenhydrAMINE capsule 25 mg  -     hydrALAZINE injection 10 mg  -     Inpatient consult to Wound Care; Standing  -     nicotine 21 mg/24 hr 1 patch  -     lisinopril tablet 10 mg  -     metoprolol tartrate (LOPRESSOR) tablet 50 mg  -     amLODIPine tablet 10 mg  -     Cancel: Lactic acid, plasma; Standing  -     Culture, Anaerobe; Standing  -     Aerobic culture; Standing  -     gadobutrol 10 mL  -     Cancel: Lactic acid, plasma; Standing  -     Lactic acid, plasma; Standing  -     Lactic acid, plasma; Standing  -     Inpatient consult to Wound Care; Standing  -     Discontinue: vancomycin 1.5 g in 5 % dextrose 250 mL IVPB  -     Cancel: VANCOMYCIN, TROUGH before 4th dose; Standing  -     Discontinue: vancomycin 1.5 g in 5 % dextrose 250 mL IVPB  -     VANCOMYCIN, TROUGH before next dose; Standing  -     Inpatient consult to Podiatry; Standing  -     Inpatient consult to Infectious Diseases; Standing  -     Cancel: Diet NPO; Standing  -     Discontinue: ketorolac injection 15 mg  -     VANCOMYCIN, TROUGH before next dose; Standing  -     Notify Physician - Potential Need of Opioid Reversal; Standing  -     maintenance fluids per service; Standing  -     sodium chloride 0.9% flush 3 mL  -     VANCOMYCIN, TROUGH before 3rd dose; Standing  -     Cancel: Diet Adult Regular (IDDSI Level 7) [facility]; Standing  -     Cancel: Diet NPO; Standing  -     Discontinue: nozaseptin (NOZIN) nasal   -     Cancel: Admit to Phase 1 PACU, transfer to Phase 2 per protocol when indicated ; Standing  -     Cancel: Vital signs; Standing  -     Cancel: Intake and output Per protocol; Standing  -     Cancel: Apply warming blanket; Standing  -     Cancel: Notify  Anesthesiologist; Standing  -     Cancel: Notify Physician - Potential Need of Opioid Reversal; Standing  -     Cancel: maintenance fluids per service; Standing  -     Discontinue: sodium chloride 0.9% flush 3 mL  -     Discontinue: sodium chloride 0.9% flush 3 mL  -     acetaminophen (10 mg/mL) injection 1,000 mg  -     Discontinue: hydromorphone (PF) injection 0.2 mg  -     Discontinue: meperidine injection 12.5 mg  -     Cancel: Oxygen Continuous; Standing  -     Cancel: Pulse Oximetry Q4H; Standing  -     Culture, Anaerobe; Standing  -     Aerobic culture; Standing  -     Discontinue: bupivacaine (PF) 0.5% (5 mg/mL) injection  -     Discontinue: lidocaine (PF) 10 mg/ml (1%) injection  -     Specimen to Pathology - Surgery; Standing  -     Specimen to Pathology - Surgery; Standing  -     Transfer patient; Standing  -     Diet Adult Regular (IDDSI Level 7) [facility]; Standing  -     Blood culture; Standing  -     CBC auto differential; Standing  -     Basic metabolic panel; Standing  -     ketorolac injection 15 mg  -     Cancel: VANCOMYCIN, TROUGH before 3rd dose; Standing  -     cefazolin (ANCEF) 2 gram in dextrose 5% 50 mL IVPB (premix)  -     Sedimentation rate; Standing  -     C-reactive protein; Standing      I counseled the patient on his conditions, regarding findings of my examination, my impressions, and usual treatment plan.   Right foot wound flushed with 30cc sterile saline and dressed with Betadine soaked adaptic, gauze, kerlix, and ACE.   Patient instructed to keep dressing dry, clean and intact.   Patient instructed to continue to ambulate in surgical shoe  Bone biopsy and deep wound cultures pending.   Continue IV antibiotics.   Nursing dressing change orders completed.   Patient to be scheduled for 1 week post op at the [facility].                     Piter Marie DPM  [facility]

## 2019-03-26 NOTE — PLAN OF CARE
Problem: Adult Inpatient Plan of Care  Goal: Plan of Care Review  Outcome: Ongoing (interventions implemented as appropriate)  Pt denies numbness or tingling to RLE. Dressing intact. Pt repositions independently. Pain moderately controlled with PRN pain meds. VSS.  Fall precautions in place. Call light and personal items within reach. Educated patient on side effects of medication administered. Pt verbalized understanding. 24 hour order check done.  Will continue to monitor.

## 2019-03-26 NOTE — OP NOTE
Patient: Jonh Najera   : 1975  MR# [id number]  DOS: 19  Surgeon: Dr. Piter Marie DPM  Assistant: None  Pre-Op Dx: Right Foot Cellulitis r/o Osteomyelitis    Post-Op Dx:Right Foot Cellulitis r/o Osteomyelitis    Procedure: Incision and Drainage of Right foot, Bone Biopsy  Anesthesia: Local MAC  Hemostasis:Pneumatic Ankle Tourniquet @250mmHg  EBL: 5cc  Materials: 1inch Iodoform packing, 3-0 nylon  Injectables: pre-op: 1:1 mixture of 20cc of 1% Lidocaine plain and 0.5%                          Marcaine plain                         post-op: 10cc of 0.5% Marcaine plain   Findings: Necrotic tissue and purulent drainage, moderate gouty tophi    Procedure in Detail:    Patient was brought into operating room and placed on operating table in the supine position. A pneumatic ankle tourniquet was then place about the patients Right ankle. Following IV sedation, local anesthesia was obtained about the patients Right 1st ray utilizing a total of 20cc of 1% Lidocaine plain. The Right foot was then scrubbed, prepped, and draped in the usual aseptic manner. The pneumatic ankle tourniquet was then inflated.    Attention was then directed to the open wound at the medial 1st metatarsal head aspect of the Right foot, where utilizing a #15 blade sharp debridement of all necrotic tissue until pink granular tissue was noted, then upon removal of necrotic tissue, approximately 3cc of purulent drainage was noted. Moderate gouty tophi tissue noted and utilizing a #15 blade sharp debridement of moderate gout tissue was removed until pink granular tissue was seen.   Next utilizing a bone cutter, medial aspect of 1st metatarsal head was resected and passed from operative filed. All edges smooth with bone rasp. The wound was then irrigated with pulse lavage saline solution.  Wound was reinspected and found to have a pink granular base. Next 1 inch iodoform packing was then applied to base of the ulcer. Skin was re  approximated with 3-0 nylon in horizontal mattress technique. Next 10cc of 0.5% Marcaine plain was injected into operative site.  The wound was then dressed with compressive dressing consisting of 4x4 gauze, rain, and ACE bandages    At this time, a pneumatic ankle tourniquet was then deflated and a prompt hyperemic response was noted to all toes of the Right foot. The patient tolerated anesthesia and procedure well. The patient was transported to PACU with vital signs stable and neurovascular status intact to the Right foot.

## 2019-03-26 NOTE — ASSESSMENT & PLAN NOTE
2/2 to above   Staph  MSSA  Cont current  tx   final result :   STAPHYLOCOCCUS AUREUS   ID consult required at [facility],[place],[facility] and [facility]   locations.    Susceptibility      Staphylococcus aureus     CULTURE, BLOOD     Clindamycin <=0.5  Sensitive     Erythromycin >4  Resistant     Oxacillin <=0.25  Sensitive     Penicillin 2  Resistant     Tetracycline <=4  Sensitive     Trimeth/Sulfa <=0.5/9.5  Sensitive              S/p vanc and zosyn . Changed to cefazolin

## 2019-03-26 NOTE — PLAN OF CARE
Problem: Adult Inpatient Plan of Care  Goal: Plan of Care Review  Outcome: Ongoing (interventions implemented as appropriate)  Pt resting in bed with tv on. No changes since am assessment. C/o pain, see MAR for administrations. VSS, will continue to monitor.

## 2019-03-27 VITALS
TEMPERATURE: 98 F | DIASTOLIC BLOOD PRESSURE: 88 MMHG | SYSTOLIC BLOOD PRESSURE: 140 MMHG | RESPIRATION RATE: 18 BRPM | HEART RATE: 58 BPM | BODY MASS INDEX: 33.67 KG/M2 | HEIGHT: 67 IN | WEIGHT: 214.5 LBS | OXYGEN SATURATION: 100 %

## 2019-03-27 PROBLEM — L02.611 CELLULITIS AND ABSCESS OF TOE, RIGHT: Status: RESOLVED | Noted: 2019-03-22 | Resolved: 2019-03-27

## 2019-03-27 PROBLEM — L03.031 CELLULITIS AND ABSCESS OF TOE, RIGHT: Status: RESOLVED | Noted: 2019-03-22 | Resolved: 2019-03-27

## 2019-03-27 LAB
ANION GAP SERPL CALC-SCNC: 9 MMOL/L (ref 8–16)
BACTERIA SPEC AEROBE CULT: NORMAL
BASOPHILS # BLD AUTO: 0.07 K/UL (ref 0–0.2)
BASOPHILS NFR BLD: 0.6 % (ref 0–1.9)
BUN SERPL-MCNC: 14 MG/DL (ref 6–20)
CALCIUM SERPL-MCNC: 9.5 MG/DL (ref 8.7–10.5)
CHLORIDE SERPL-SCNC: 101 MMOL/L (ref 95–110)
CO2 SERPL-SCNC: 28 MMOL/L (ref 23–29)
CREAT SERPL-MCNC: 1.2 MG/DL (ref 0.5–1.4)
DIFFERENTIAL METHOD: ABNORMAL
EOSINOPHIL # BLD AUTO: 1.1 K/UL (ref 0–0.5)
EOSINOPHIL NFR BLD: 9.7 % (ref 0–8)
ERYTHROCYTE [DISTWIDTH] IN BLOOD BY AUTOMATED COUNT: 13.6 % (ref 11.5–14.5)
EST. GFR  (AFRICAN AMERICAN): >60 ML/MIN/1.73 M^2
EST. GFR  (NON AFRICAN AMERICAN): >60 ML/MIN/1.73 M^2
GLUCOSE SERPL-MCNC: 96 MG/DL (ref 70–110)
HCT VFR BLD AUTO: 42.5 % (ref 40–54)
HGB BLD-MCNC: 14.1 G/DL (ref 14–18)
LYMPHOCYTES # BLD AUTO: 2.6 K/UL (ref 1–4.8)
LYMPHOCYTES NFR BLD: 22.9 % (ref 18–48)
MCH RBC QN AUTO: 27.3 PG (ref 27–31)
MCHC RBC AUTO-ENTMCNC: 33.2 G/DL (ref 32–36)
MCV RBC AUTO: 82 FL (ref 82–98)
MONOCYTES # BLD AUTO: 1.3 K/UL (ref 0.3–1)
MONOCYTES NFR BLD: 11 % (ref 4–15)
NEUTROPHILS # BLD AUTO: 6.3 K/UL (ref 1.8–7.7)
NEUTROPHILS NFR BLD: 55.8 % (ref 38–73)
PLATELET # BLD AUTO: 448 K/UL (ref 150–350)
PMV BLD AUTO: 8.8 FL (ref 9.2–12.9)
POTASSIUM SERPL-SCNC: 4.4 MMOL/L (ref 3.5–5.1)
RBC # BLD AUTO: 5.16 M/UL (ref 4.6–6.2)
SODIUM SERPL-SCNC: 138 MMOL/L (ref 136–145)
WBC # BLD AUTO: 11.36 K/UL (ref 3.9–12.7)

## 2019-03-27 PROCEDURE — S4991 NICOTINE PATCH NONLEGEND: HCPCS | Performed by: PODIATRIST

## 2019-03-27 PROCEDURE — 85025 COMPLETE CBC W/AUTO DIFF WBC: CPT

## 2019-03-27 PROCEDURE — 63600175 PHARM REV CODE 636 W HCPCS: Performed by: INTERNAL MEDICINE

## 2019-03-27 PROCEDURE — 80048 BASIC METABOLIC PNL TOTAL CA: CPT

## 2019-03-27 PROCEDURE — 25000003 PHARM REV CODE 250: Performed by: PODIATRIST

## 2019-03-27 PROCEDURE — 36415 COLL VENOUS BLD VENIPUNCTURE: CPT

## 2019-03-27 RX ORDER — CEFAZOLIN SODIUM 2 G/50ML
2000 SOLUTION INTRAVENOUS EVERY 8 HOURS
Qty: 2000 ML | Refills: 0
Start: 2019-03-27

## 2019-03-27 RX ADMIN — CEFAZOLIN SODIUM 2 G: 2 SOLUTION INTRAVENOUS at 10:03

## 2019-03-27 RX ADMIN — NICOTINE 1 PATCH: 21 PATCH, EXTENDED RELEASE TRANSDERMAL at 08:03

## 2019-03-27 RX ADMIN — LISINOPRIL 10 MG: 10 TABLET ORAL at 08:03

## 2019-03-27 RX ADMIN — FAMOTIDINE 20 MG: 20 TABLET, FILM COATED ORAL at 08:03

## 2019-03-27 RX ADMIN — AMLODIPINE BESYLATE 10 MG: 10 TABLET ORAL at 08:03

## 2019-03-27 RX ADMIN — OXYCODONE AND ACETAMINOPHEN 1 TABLET: 10; 325 TABLET ORAL at 05:03

## 2019-03-27 RX ADMIN — CEFAZOLIN SODIUM 2 G: 2 SOLUTION INTRAVENOUS at 05:03

## 2019-03-27 NOTE — NURSING
Patient left floor via wheelchair. Dc home. Picc line inact. DC orders gone over verbalized understanding. Discussed in detail importance of follow up appointments.

## 2019-03-27 NOTE — CONSULTS
Consult received for IV antibiotics.  CM sent referral to [facility] via [facility].

## 2019-03-27 NOTE — PLAN OF CARE
Problem: Adult Inpatient Plan of Care  Goal: Plan of Care Review  Outcome: Ongoing (interventions implemented as appropriate)  Pt complains of right foot pain. Pain medication is effective. Pt denies numbness/tingling to right foot. Dressing is dry and intact. IV ABX given per order. No injuries. Will continue to monitor. 12 hour chart check is completed.

## 2019-03-27 NOTE — PLAN OF CARE
MELY made an appointment with DALTON rosa to establish care.  Appointment date: 4-2-2019 at 11:30 am

## 2019-03-27 NOTE — PLAN OF CARE
MELY received a message from Rain with [employer].  Patient is covered at 100%.  Rain will come to bedside to teach    @[phone] Rain with [facility] has completed bedside teaching.  Patient could not provide address of where he will discharge to.  He was provided with the number to call [facility] to advise of address so that IV meds can be delivered tonight.  Patient is getting PICC at this time.  Patient will go to [facility] for PICC care as he will not have home health services.

## 2019-03-27 NOTE — OP NOTE
Pre Op Diagnosis: abscess     Post Op Diagnosis: same     Procedure:  LUE picc      Procedure performed by: Gissell ROSADO, Shama MUELLER     Written Informed Consent Obtained: Yes     Specimen Removed:  no     Estimated Blood Loss:  minimal     Findings: Local anesthesia and moderate sedation were used.     The patient tolerated the procedure well and there were no complications.      The LUE was sterilely prepped and draped.  Lidocaine was used as a local anesthetic.  Using u/s guidance a 5 German 41 cm picc was placed into the basilic vein into the SVC/right atrium junction.  Placement was verified using X Ray.  PICC was secured in place with attachment device and is ready to use.  Pt tolerated proc well without immediate complications.

## 2019-03-28 LAB — BACTERIA BLD CULT: NORMAL

## 2019-03-28 NOTE — PLAN OF CARE
Apr1 Post OP with Piter Marie DPM   Monday Apr 1, 2019 1:20 PM   Arrive at check-in approximately 15 minutes before your scheduled appointment time. Bring all outside medical records and imaging, along with a list of your current medications and insurance card.    1st Floor  [place] [address], [address] Turn left at the first stop light onto [address]. The complex is NOT accessible from [facility]   [address] [address]  [place] [address]   [phone]          [facility] for [facility]usions       03/28/19 0759   Final Note   Assessment Type Final Discharge Note   Anticipated Discharge Disposition Home   Hospital Follow Up  Appt(s) scheduled? Yes   Right Care Referral Info   Post Acute Recommendation No Care

## 2019-03-29 ENCOUNTER — PATIENT OUTREACH (OUTPATIENT)
Dept: ADMINISTRATIVE | Facility: CLINIC | Age: 44
End: 2019-03-29

## 2019-03-29 LAB — BACTERIA SPEC ANAEROBE CULT: NORMAL

## 2019-03-29 RX ORDER — ALLOPURINOL 300 MG/1
300 TABLET ORAL DAILY
COMMUNITY

## 2019-03-29 RX ORDER — NAPROXEN SODIUM 220 MG
220 TABLET ORAL 2 TIMES DAILY PRN
COMMUNITY

## 2019-03-29 NOTE — PATIENT INSTRUCTIONS
Abscess (Incision & Drainage)  An abscess is sometimes called a boil. It happens when bacteria get trapped under the skin and start to grow. Pus forms inside the abscess as the body responds to the bacteria. An abscess can happen with an insect bite, ingrown hair, blocked oil gland, pimple, cyst, or puncture wound.  Your healthcare provider has drained the pus from your abscess. If the abscess pocket was large, your healthcare provider may have put in gauze packing. Your provider will need to remove it on your next visit. He or she may also replace it at that time. You may not need antibiotics to treat a simple abscess, unless the infection is spreading into the skin around the wound (cellulitis).  The wound will take about 1 to 2 weeks to heal, depending on the size of the abscess. Healthy tissue will grow from the bottom and sides of the opening until it seals over.  Home care  These tips can help your wound heal:  · The wound may drain for the first 2 days. Cover the wound with a clean dry dressing. Change the dressing if it becomes soaked with blood or pus.  · If a gauze packing was placed inside the abscess pocket, you may be told to remove it yourself. You may do this in the shower. Once the packing is removed, you should wash the area in the shower, or clean the area as directed by your provider. Continue to do this until the skin opening has closed. Make sure you wash your hands after changing the packing or cleaning the wound.  · If you were prescribed antibiotics, take them as directed until they are all gone.  · You may use acetaminophen or ibuprofen to control pain, unless another pain medicine was prescribed. If you have liver disease or ever had a stomach ulcer, talk with your doctor before using these medicines.  Follow-up care  Follow up with your healthcare provider, or as advised. If a gauze packing was put in your wound, it should be removed in 1 to 2 days. Check your wound every day for any  signs that the infection is getting worse. The signs are listed below.  When to seek medical advice  Call your healthcare provider right away if any of these occur:  · Increasing redness or swelling  · Red streaks in the skin leading away from the wound  · Increasing local pain or swelling  · Continued pus draining from the wound 2 days after treatment  · Fever of 100.4ºF (38ºC) or higher, or as directed by your healthcare provider  · Boil returns when you are at home  Date Last Reviewed: 9/1/2016  © 1627-5332 [employer]. [address] [place] [address]. All rights reserved. This information is not intended as a substitute for professional medical care. Always follow your healthcare professional's instructions.

## 2019-03-29 NOTE — ASSESSMENT & PLAN NOTE
Cont IVAB   Repeat sed rate and CRP  ID consulted   We will consult  For 4 to 6 week  With cefazolin for MSSA bacterima/osteo

## 2019-03-29 NOTE — DISCHARGE SUMMARY
[facility] Hospital Medicine  Discharge Summary      Patient Name: Jonh Najera  MRN: [id number]  Admission Date: 3/22/2019  Hospital Length of Stay: 5 days  Discharge Date and Time: 3/27/2019  4:16 PM  Attending Physician: No att. providers found   Discharging Provider: Faustino Schaefer MD  Primary Care Provider: Afua Notinsystem      HPI:   Mr. Najera is a 43-year-old  male with PMH significant for gouty arthritis, followed by rheumatologist, Dr. Odom, HTN, presents to the ED complaining of right 1st metatarsal joint wound that started around 4-5 days ago.  Patient denies fever, chills.  In the ED he is found to have a purulent appearing large wound of the right medial 1st metatarsal joint.  WBC 48576.  3% bands.  Afebrile.  HR 88-97.  Lactic acid pending.  .  X-ray of the right foot reveals arthritic type changes with possibilities of erosion involving the 1st metatarsal joint, with associated large amount of soft tissue swelling/cellulitis.  Patient started on IV vancomycin, IV Zosyn.  MRI right foot pending to rule out osteomyelitis.    Procedure(s) (LRB):  INCISION AND DRAINAGE, FOOT (Right)  BIOPSY, BONE (Right)      Hospital Course:   44 y/o wm admitted with a dx of right toe cellutis and  An abscess . He was started on IVAB . The MRI show  Cellulitis and  Small fluid collection . Podiatrist  was consulted and plan for I&D  Tomorrow .  3/24 Pt was seen and examined at bedside . There was no acute event overnight .  He is schedule for I&D today . The blood cx are positive for GPC   3/25 Pt is s/p I&D today . The blood cx is (+) for  Staph . There was no acute event overnight   3/26 Pt was seen and examined at bedside . The blood cx  Is Staph  MSSA . The IVAB was changed to cefazolin . There was no acute event overnight   3/27 Pt was schedule for Home   IVAB  Cefazolin  X 4 week for bacteremia /osteo , Patient will f/u PCP /Podiatrist / ID as outpt . The  repeated blood cx is NGTD . Pt was seen and examined at bedside . He was determined to be suitable for d/c      Consults:   Consults (From admission, onward)        Status Ordering Provider     Inpatient consult to Podiatry  Once     Provider:  (Not yet assigned)    Completed HARVINDER CRABTREE     Inpatient consult to Social Work  Once     Provider:  (Not yet assigned)    Completed HARVINDER CRABTREE          Osteomyelitis of right foot  Cont IVAB   Repeat sed rate and CRP  ID consulted   We will consult  For 4 to 6 week  With cefazolin for MSSA bacterima/osteo       Bacteremia due to Gram-positive bacteria  2/2 to above   Staph  MSSA  Cont current  tx   final result :   STAPHYLOCOCCUS AUREUS   ID consult required at [facility] and [facility].    Susceptibility      Staphylococcus aureus     CULTURE, BLOOD     Clindamycin <=0.5  Sensitive     Erythromycin >4  Resistant     Oxacillin <=0.25  Sensitive     Penicillin 2  Resistant     Tetracycline <=4  Sensitive     Trimeth/Sulfa <=0.5/9.5  Sensitive              S/p vanc and zosyn . Changed to cefazolin   Repeated blood cx NGTD      Hypertension  Continue home dose lisinopril and amlodipine.  Avoid HCTZ.  Monitor blood pressure closely and make adjustments as needed.        Final Active Diagnoses:    Diagnosis Date Noted POA    Osteomyelitis of right foot [M86.9] 03/25/2019 Yes    Chronic gouty arthritis [M1A.00X0] 03/22/2019 Yes    Bacteremia due to Gram-positive bacteria [R78.81] 05/13/2014 Yes    Hypertension [I10]  Yes      Problems Resolved During this Admission:    Diagnosis Date Noted Date Resolved POA    PRINCIPAL PROBLEM:  Cellulitis and abscess of toe, right [L03.031, L02.611] 03/22/2019 03/27/2019 Yes       Discharged Condition: stable    Disposition: Home or Self Care    Follow Up:  Follow-up Information     Provider Belinda In 1 week.           Piter Marie DPM In 1 week.    Specialty:   Podiatry  Contact information:  [id number]   [place] [address]  [phone]             Krunal London MD In 1 week.    Specialty:  Infectious Diseases  Contact information:  [address] [place] [address]  [phone]             [facility].    Specialty:  Dialysis Center  Why:  Home Infusion  Contact information:  [address] [place] [address]  [phone]             [facility] . Go on 4/3/2019.    Why:  at 11:30 am  Contact information:  [address] [place]  [address]  [phone]               Patient Instructions:      Diet Cardiac     Sponge bath only until clinic visit     Keep surgical extremity elevated     Ice to affected area     Lifting restrictions     Other restrictions (specify):     No driving until:     Activity as tolerated     Shower on day dressing removed (No bath)     Weight bearing restrictions (specify):       Significant Diagnostic Studies: Labs: BMP: No results for input(s): GLU, NA, K, CL, CO2, BUN, CREATININE, CALCIUM, MG in the last 48 hours., CMP No results for input(s): NA, K, CL, CO2, GLU, BUN, CREATININE, CALCIUM, PROT, ALBUMIN, BILITOT, ALKPHOS, AST, ALT, ANIONGAP, ESTGFRAFRICA, EGFRNONAA in the last 48 hours. and CBC No results for input(s): WBC, HGB, HCT, PLT in the last 48 hours.  Microbiology:   Blood Culture   Lab Results   Component Value Date    LABBLOO No Growth to date 03/26/2019    LABBLOO No Growth to date 03/26/2019    LABBLOO No Growth to date 03/26/2019    LABBLOO No Growth to date 03/26/2019       Pending Diagnostic Studies:     None         Medications:  Reconciled Home Medications:      Medication List      START taking these medications    ceFAZolin 2 g/50mL Dextrose IVPB 2 gram/50 mL Pgbk  Commonly known as:  ANCEF  Inject 50 mLs (2,000 mg total) into the vein every 8 (eight) hours.        CONTINUE taking these medications    colchicine 0.6 mg tablet  Commonly known as:  COLCRYS  1  tablet PO at the first sign of a gout flare then 1 tablet PO 1 hour later.  Do not exceed 1.2 mg in a 24 hour period     lisinopril 10 MG tablet  Take 10 mg by mouth once daily.     metoprolol tartrate 50 MG tablet  Commonly known as:  LOPRESSOR  Take 50 mg by mouth 2 (two) times daily.            Indwelling Lines/Drains at time of discharge:   Lines/Drains/Airways     Peripherally Inserted Central Catheter Line                 PICC Double Lumen 03/27/19 1430 left basilic 2 days                Time spent on the discharge of patient: 35  minutes  Patient was seen and examined on the date of discharge and determined to be suitable for discharge.         Faustino Schaefer MD  Department of Hospital Medicine  [facility]

## 2019-03-29 NOTE — ASSESSMENT & PLAN NOTE
2/2 to above   Staph  MSSA  Cont current  tx   final result :   STAPHYLOCOCCUS AUREUS   ID consult required at [facility],[facility] and [facility]   locations.    Susceptibility      Staphylococcus aureus     CULTURE, BLOOD     Clindamycin <=0.5  Sensitive     Erythromycin >4  Resistant     Oxacillin <=0.25  Sensitive     Penicillin 2  Resistant     Tetracycline <=4  Sensitive     Trimeth/Sulfa <=0.5/9.5  Sensitive              S/p vanc and zosyn . Changed to cefazolin   Repeated blood cx NGTD

## 2019-03-31 LAB — BACTERIA BLD CULT: NORMAL

## 2020-01-01 NOTE — PLAN OF CARE
Problem: Adult Inpatient Plan of Care  Goal: Plan of Care Review  POC reviewed. Pt remained free from injury. Pain controlled. Safe environment provided. IV antibiotics administered. IV site clean, dry, and intact with no redness or swelling. Labs monitored. Will continue to monitor.       2020

## 2021-05-06 NOTE — PLAN OF CARE
Problem: Adult Inpatient Plan of Care  Goal: Plan of Care Review  Fall precautions maintained. Pt free from injuries/falls.  Ambulates and repositions  C/o LRE sx incisional pain. Controlled with ordered pain meds. Possible d/c if PICC line is placed.  POC and meds discussed w/ pt. Pt verbalized understanding.  Chart check done.   Will cont to monitor.        Zyclara Counseling:  I discussed with the patient the risks of imiquimod including but not limited to erythema, scaling, itching, weeping, crusting, and pain.  Patient understands that the inflammatory response to imiquimod is variable from person to person and was educated regarded proper titration schedule.  If flu-like symptoms develop, patient knows to discontinue the medication and contact us.

## 2023-09-28 NOTE — ASSESSMENT & PLAN NOTE
Acute gout flare up superimpose with an infection   Patient reports taking allopurinol on a daily basis.  Add HSAI PRN   Followed by rheumatologist, Dr. Odom in the [facility].     alert/oriented to person/oriented to place/oriented to time/oriented to situation/recall memory is intact/recent memory is intact/remote memory is intact/behavior seems appropriate to situation

## (undated) DEVICE — SEE MEDLINE ITEM 152739

## (undated) DEVICE — SEE MEDLINE ITEM 157027

## (undated) DEVICE — SEE MEDLINE ITEM 157131

## (undated) DEVICE — SEE MEDLINE ITEM 154981

## (undated) DEVICE — SUPPORT ULNA NERVE PROTECTOR

## (undated) DEVICE — BLADE SURG #15 CARBON STEEL

## (undated) DEVICE — SEE MEDLINE ITEM 146308

## (undated) DEVICE — PAD CAST SPECIALIST STRL 4

## (undated) DEVICE — GLOVE 6.5 PROTEXIS PI BLUE

## (undated) DEVICE — KIT IRR SUCTION HND PIECE

## (undated) DEVICE — SEE MEDLINE ITEM 146298

## (undated) DEVICE — TIP SUCTION YANKAUER

## (undated) DEVICE — GLOVE 6.5 PROTEXIS PI MICRO

## (undated) DEVICE — SCRUB 10% POVIDONE IODINE 4OZ

## (undated) DEVICE — SEE MEDLINE ITEM 157216

## (undated) DEVICE — SPONGE DERMACEA GAUZE 4X4

## (undated) DEVICE — SYR 10CC LUER LOCK

## (undated) DEVICE — DRESSING N ADH OIL EMUL 3X3

## (undated) DEVICE — SWAB CULTURETTE II DUAL

## (undated) DEVICE — NDL SAFETY 25G X 1.5 ECLIPSE

## (undated) DEVICE — SEE MEDLINE ITEM 146231

## (undated) DEVICE — SEE MEDLINE ITEM 152529

## (undated) DEVICE — MANIFOLD 4 PORT

## (undated) DEVICE — TOURNIQUET SB QC DP 18X4IN

## (undated) DEVICE — BLADE LONG 31.0MM X 9.0MM

## (undated) DEVICE — SEE MEDLINE ITEM 152523

## (undated) DEVICE — ELECTRODE REM PLYHSV RETURN 9

## (undated) DEVICE — BANDAGE ACE DOUBLE STER 6IN

## (undated) DEVICE — SEE MEDLINE ITEM 152622

## (undated) DEVICE — PACKING STRIP IDOFORM 1X5YD

## (undated) DEVICE — DRAPE PLASTIC U 60X72

## (undated) DEVICE — DRAPE SURG W/TWL 17 5/8X23

## (undated) DEVICE — GAUZE SPONGE 4X4 12PLY

## (undated) DEVICE — SOL IRR NACL .9% 3000ML

## (undated) DEVICE — POSITIONER HEAD DONUT 9IN FOAM